# Patient Record
Sex: MALE | Race: AMERICAN INDIAN OR ALASKA NATIVE
[De-identification: names, ages, dates, MRNs, and addresses within clinical notes are randomized per-mention and may not be internally consistent; named-entity substitution may affect disease eponyms.]

---

## 2019-01-02 ENCOUNTER — HOSPITAL ENCOUNTER (EMERGENCY)
Dept: HOSPITAL 5 - ED | Age: 74
LOS: 1 days | Discharge: HOME | End: 2019-01-03
Payer: MEDICARE

## 2019-01-02 DIAGNOSIS — G43.909: ICD-10-CM

## 2019-01-02 DIAGNOSIS — F17.200: ICD-10-CM

## 2019-01-02 DIAGNOSIS — E86.0: ICD-10-CM

## 2019-01-02 DIAGNOSIS — N39.0: Primary | ICD-10-CM

## 2019-01-02 DIAGNOSIS — K21.9: ICD-10-CM

## 2019-01-02 DIAGNOSIS — M19.90: ICD-10-CM

## 2019-01-02 LAB
%HYPO/RBC NFR BLD AUTO: (no result) %
ALBUMIN SERPL-MCNC: 4 G/DL (ref 3.9–5)
ALT SERPL-CCNC: 23 UNITS/L (ref 7–56)
ANISOCYTOSIS BLD QL SMEAR: (no result)
APTT BLD: 32 SEC. (ref 24.2–36.6)
BAND NEUTROPHILS # (MANUAL): 0 K/MM3
BILIRUB UR QL STRIP: (no result)
BLOOD UR QL VISUAL: (no result)
BUN SERPL-MCNC: 17 MG/DL (ref 9–20)
BUN/CREAT SERPL: 19 %
CALCIUM SERPL-MCNC: 9.4 MG/DL (ref 8.4–10.2)
HCT VFR BLD CALC: 29.8 % (ref 35.5–45.6)
HEMOLYSIS INDEX: 3
HGB BLD-MCNC: 8.9 GM/DL (ref 11.8–15.2)
INR PPP: 1.03 (ref 0.87–1.13)
MCHC RBC AUTO-ENTMCNC: 30 % (ref 32–34)
MCV RBC AUTO: 69 FL (ref 84–94)
MYELOCYTES # (MANUAL): 0 K/MM3
PH UR STRIP: 5 [PH] (ref 5–7)
PLATELET # BLD: 191 K/MM3 (ref 140–440)
POIKILOCYTOSIS BLD QL SMEAR: (no result)
PROMYELOCYTES # (MANUAL): 0 K/MM3
PROT UR STRIP-MCNC: (no result) MG/DL
RBC # BLD AUTO: 4.34 M/MM3 (ref 3.65–5.03)
RBC #/AREA URNS HPF: 5 /HPF (ref 0–6)
TARGETS BLD QL SMEAR: (no result)
TOTAL CELLS COUNTED BLD: 100
UROBILINOGEN UR-MCNC: 2 MG/DL (ref ?–2)
WBC #/AREA URNS HPF: 23 /HPF (ref 0–6)

## 2019-01-02 PROCEDURE — 86901 BLOOD TYPING SEROLOGIC RH(D): CPT

## 2019-01-02 PROCEDURE — 85610 PROTHROMBIN TIME: CPT

## 2019-01-02 PROCEDURE — 71045 X-RAY EXAM CHEST 1 VIEW: CPT

## 2019-01-02 PROCEDURE — 86900 BLOOD TYPING SEROLOGIC ABO: CPT

## 2019-01-02 PROCEDURE — 96361 HYDRATE IV INFUSION ADD-ON: CPT

## 2019-01-02 PROCEDURE — 83690 ASSAY OF LIPASE: CPT

## 2019-01-02 PROCEDURE — 84484 ASSAY OF TROPONIN QUANT: CPT

## 2019-01-02 PROCEDURE — 81001 URINALYSIS AUTO W/SCOPE: CPT

## 2019-01-02 PROCEDURE — 82962 GLUCOSE BLOOD TEST: CPT

## 2019-01-02 PROCEDURE — 93005 ELECTROCARDIOGRAM TRACING: CPT

## 2019-01-02 PROCEDURE — 70450 CT HEAD/BRAIN W/O DYE: CPT

## 2019-01-02 PROCEDURE — 93010 ELECTROCARDIOGRAM REPORT: CPT

## 2019-01-02 PROCEDURE — 36415 COLL VENOUS BLD VENIPUNCTURE: CPT

## 2019-01-02 PROCEDURE — 99285 EMERGENCY DEPT VISIT HI MDM: CPT

## 2019-01-02 PROCEDURE — 85007 BL SMEAR W/DIFF WBC COUNT: CPT

## 2019-01-02 PROCEDURE — 85730 THROMBOPLASTIN TIME PARTIAL: CPT

## 2019-01-02 PROCEDURE — 96365 THER/PROPH/DIAG IV INF INIT: CPT

## 2019-01-02 PROCEDURE — 86850 RBC ANTIBODY SCREEN: CPT

## 2019-01-02 PROCEDURE — 85025 COMPLETE CBC W/AUTO DIFF WBC: CPT

## 2019-01-02 PROCEDURE — 80053 COMPREHEN METABOLIC PANEL: CPT

## 2019-01-02 NOTE — EMERGENCY DEPARTMENT REPORT
ED Dizziness HPI





- General


Chief Complaint: GI Bleed


Stated Complaint: GLUCOSE LOW


Time Seen by Provider: 01/02/19 21:08


Source: patient


Mode of arrival: Ambulatory


Limitations: No Limitations





- History of Present Illness


Initial Comments: 





73-year-old male presents to ED with report of dizziness and generalized 

weakness 3 weeks.  Patient states he has been having dark-colored stools for 

the last 3 weeks.  Patient concerned he may have a GI bleed, which she has had 

in the past.  Patient denies fever, abdominal pain, nausea, vomiting, diarrhea.


MD Complaint: dizziness


-: week(s) (3)


Timing: gradual onset


Description: lightheadedness


History of Trauma: No


Severity: mild


Improves With: nothing


Worsens With: nothing


Associated Symptoms: weakness (generalized).  denies: chest pain, cough, 

fever/chills, shortness of breath, syncope





- Related Data


                                Home Medications











 Medication  Instructions  Recorded  Confirmed  Last Taken


 


Pantoprazole [Protonix] 40 mg PO QDAY 09/20/16 09/22/16 09/20/16


 


Ranitidine (Nf) [Zantac (Nf)] 150 mg PO BID 09/20/16 09/22/16 09/20/16


 


buPROPion SR [Wellbutrin Sr] 150 mg PO BID 09/20/16 09/22/16 09/20/16








                                  Previous Rx's











 Medication  Instructions  Recorded  Last Taken  Type


 


HYDROcodone/APAP 5-325 [[place] 1 each PO Q4HR PRN #20 tablet 09/22/16 Unknown Rx





5/325]    


 


Nitrofurantoin Mono/M-Cryst 100 mg PO Q12HR #14 capsule 01/02/19 Unknown Rx





[Macrobid CAP]    











                                    Allergies











Allergy/AdvReac Type Severity Reaction Status Date / Time


 


No Known Allergies Allergy   Verified 10/14/15 15:43














ED Review of Systems


ROS: 


Stated complaint: GLUCOSE LOW


Other details as noted in HPI





Comment: All other systems reviewed and negative


Constitutional: denies: chills, fever


Respiratory: denies: cough, shortness of breath


Cardiovascular: denies: chest pain


Gastrointestinal: melena.  denies: abdominal pain, nausea, vomiting, diarrhea


Genitourinary: frequency


Neurological: denies: headache, numbness, paresthesias





ED Past Medical Hx





- Past Medical History


Hx Hypertension: No


Hx Heart Attack/AMI: No


Hx GERD: Yes


Hx Arthritis: Yes


Hx Headaches / Migraines: Yes (MIGRAINES)


Additional medical history: Peptic ulcer disease status post surgery.  ANEMIA





- Surgical History


Additional Surgical History: Peptic ulcer disease surgery, hemorrhoidectomy





- Social History


Smoking Status: Current Every Day Smoker


Substance Use Type: Alcohol





- Medications


Home Medications: 


                                Home Medications











 Medication  Instructions  Recorded  Confirmed  Last Taken  Type


 


Pantoprazole [Protonix] 40 mg PO QDAY 09/20/16 09/22/16 09/20/16 History


 


Ranitidine (Nf) [Zantac (Nf)] 150 mg PO BID 09/20/16 09/22/16 09/20/16 History


 


buPROPion SR [Wellbutrin Sr] 150 mg PO BID 09/20/16 09/22/16 09/20/16 History


 


HYDROcodone/APAP 5-325 [[place] 1 each PO Q4HR PRN #20 tablet 09/22/16  Unknown Rx





5/325]     


 


Nitrofurantoin Mono/M-Cryst 100 mg PO Q12HR #14 capsule 01/02/19  Unknown Rx





[Macrobid CAP]     














ED Physical Exam





- General


Limitations: No Limitations


General appearance: alert, in no apparent distress





- Head


Head exam: Present: atraumatic, normocephalic





- Eye


Eye exam: Present: normal appearance





- ENT


ENT exam: Present: mucous membranes moist





- Neck


Neck exam: Present: normal inspection





- Respiratory


Respiratory exam: Present: normal lung sounds bilaterally.  Absent: respiratory 

distress





- Cardiovascular


Cardiovascular Exam: Present: regular rate, normal rhythm





- GI/Abdominal


GI/Abdominal exam: Present: soft.  Absent: distended, tenderness





- Rectal


Rectal exam: Present: heme (-) stool (stool is light brown)





- Extremities Exam


Extremities exam: Present: normal inspection





- Neurological Exam


Neurological exam: Present: alert, oriented X3, CN II-XII intact, normal gait.  

Absent: motor sensory deficit





- Psychiatric


Psychiatric exam: Present: normal affect, normal mood





- Skin


Skin exam: Present: warm, dry, intact, normal color





ED Course


                                   Vital Signs











  01/02/19 01/02/19 01/02/19





  19:09 22:05 22:45


 


Temperature 97.9 F  


 


Pulse Rate 103 H 88 87


 


Pulse Rate [  88 





Lying]   


 


Respiratory 18 16 24





Rate   


 


Blood Pressure 120/69  


 


Blood Pressure  134/58 





[Lying]   


 


Blood Pressure  134/58 





[Right]   


 


O2 Sat by Pulse 96 100 97





Oximetry   














  01/02/19 01/02/19 01/02/19





  23:00 23:15 23:31


 


Temperature   


 


Pulse Rate 83 82 80


 


Pulse Rate [   





Lying]   


 


Respiratory 26 H 20 18





Rate   


 


Blood Pressure 134/47 134/47 134/47


 


Blood Pressure   





[Lying]   


 


Blood Pressure   





[Right]   


 


O2 Sat by Pulse 98  87





Oximetry   














  01/02/19 01/03/19





  23:45 00:00


 


Temperature  


 


Pulse Rate 86 


 


Pulse Rate [  





Lying]  


 


Respiratory 20 





Rate  


 


Blood Pressure 134/47 150/71


 


Blood Pressure  





[Lying]  


 


Blood Pressure  





[Right]  


 


O2 Sat by Pulse 98 98





Oximetry  














ED Medical Decision Making





- Lab Data


Result diagrams: 


                                 01/02/19 19:32





                                 01/02/19 19:32





- EKG Data


-: EKG Interpreted by Me


EKG shows normal: sinus rhythm, axis, intervals, QRS complexes, ST-T waves


Rate: normal





- EKG Data


Interpretation: no acute changes, other (occasional PACs)





- Radiology Data


Radiology results: report reviewed, image reviewed





- Medical Decision Making





73-year-old male presents with 3 week history of generalized weakness, 

dizziness.  Patient initially but he was having GI bleed, since he has had that 

in the past.  Reportedly is having dark black stools, however on exam, stool is 

light brown in color and heme negative.  Vital signs normal, hemoglobin 8.9.  

Chest x-ray and head CT both negative for any acute findings.  She has normal 

neuro exam.  Orthostatics vitals show that the patient is mildly dehydrated.  1 

L bolus IV fluids given.  UA also shows UTI.  The combination of these 2 pains 

is likely what is causing patient's generalized weakness and dizziness.  Patient

also given IV Rocephin.  He is feeling much better at this time.  Will discharge

home, return precautions given.  Will give prescription for Macrobid for UTI.  

Outpatient follow-up advised.





- Differential Diagnosis


anemia, GI bleed, UTI, hypokalemia, CVA, pneumonia


Critical care attestation.: 


If time is entered above; I have spent that time in minutes in the direct care 

of this critically ill patient, excluding procedure time.








ED Disposition


Clinical Impression: 


 UTI (urinary tract infection), Dehydration





Disposition: DC-01 TO HOME OR SELFCARE


Is pt being admited?: No


Condition: Stable


Instructions:  Dehydration (ED), Urinary Tract Infection in Men (ED), Weakness 

(ED)


Prescriptions: 


Nitrofurantoin Mono/M-Cryst [Macrobid CAP] 100 mg PO Q12HR #14 capsule


Referrals: 


PRIMARY CARE,MD [Primary Care Provider] - 3-5 Days


Forms:  Accompanied Note


Time of Disposition: 23:48

## 2019-01-02 NOTE — CAT SCAN REPORT
FINAL REPORT



PROCEDURE:  CT HEAD/BRAIN WO CON



TECHNIQUE:  Computerized tomography of the head was performed without contrast material. 



HISTORY:  dizziness 



COMPARISON:  No prior studies are available for comparison.



FINDINGS:  

Skull and scalp: Normal.



Paranasal sinuses: Normal.



Ventricles and subarachnoid spaces: Are prominent consistent with cerebral atrophy appropriate for pa
tient's age..



Cerebrum: There is mild-to-moderate degree nonspecific bilateral cerebral white matter hypodensity mo
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 An old lacunar infarct is noted in right thalamus..



Cerebellum and brainstem: No evidence of hemorrhage, acute infarction or mass.



Vasculature: Normal.



Comments: None.



IMPRESSION:  

No acute intracranial abnormality



Old lacunar infarct right thalamus

## 2019-01-02 NOTE — XRAY REPORT
FINAL REPORT



PROCEDURE:  XR CHEST 1V AP



TECHNIQUE:  Chest radiograph anteroposterior view. CPT 63523







HISTORY:  weakness 



COMPARISON:  No prior studies are available for comparison.



FINDINGS:  

Heart: Normal.



Mediastinum/Vessels: Normal.



Lungs/Pleural space: Lungs are hyperinflated. There are no confluent infiltrates or mass lesions. Ple
ural spaces are clear..



Bony thorax: No acute osseous abnormality.



Life support devices: None.



IMPRESSION:  

COPD



No acute pulmonary process.

## 2019-01-03 VITALS — SYSTOLIC BLOOD PRESSURE: 150 MMHG | DIASTOLIC BLOOD PRESSURE: 71 MMHG

## 2019-01-19 ENCOUNTER — HOSPITAL ENCOUNTER (EMERGENCY)
Dept: HOSPITAL 5 - ED | Age: 74
Discharge: HOME | End: 2019-01-19
Payer: MEDICARE

## 2019-01-19 VITALS — SYSTOLIC BLOOD PRESSURE: 138 MMHG | DIASTOLIC BLOOD PRESSURE: 69 MMHG

## 2019-01-19 DIAGNOSIS — N30.00: ICD-10-CM

## 2019-01-19 DIAGNOSIS — M19.90: ICD-10-CM

## 2019-01-19 DIAGNOSIS — T78.40XA: Primary | ICD-10-CM

## 2019-01-19 DIAGNOSIS — K21.9: ICD-10-CM

## 2019-01-19 DIAGNOSIS — G43.909: ICD-10-CM

## 2019-01-19 DIAGNOSIS — F17.200: ICD-10-CM

## 2019-01-19 LAB
%HYPO/RBC NFR BLD AUTO: (no result) %
BAND NEUTROPHILS # (MANUAL): 0 K/MM3
BILIRUB UR QL STRIP: (no result)
BLOOD UR QL VISUAL: (no result)
BUN SERPL-MCNC: 16 MG/DL (ref 9–20)
BUN/CREAT SERPL: 20 %
CALCIUM SERPL-MCNC: 9.3 MG/DL (ref 8.4–10.2)
HCT VFR BLD CALC: 29.1 % (ref 35.5–45.6)
HEMOLYSIS INDEX: 0
HGB BLD-MCNC: 8.6 GM/DL (ref 11.8–15.2)
MCHC RBC AUTO-ENTMCNC: 30 % (ref 32–34)
MCV RBC AUTO: 69 FL (ref 84–94)
MYELOCYTES # (MANUAL): 0 K/MM3
PH UR STRIP: 5 [PH] (ref 5–7)
PLATELET # BLD: 214 K/MM3 (ref 140–440)
PROMYELOCYTES # (MANUAL): 0 K/MM3
PROT UR STRIP-MCNC: (no result) MG/DL
RBC # BLD AUTO: 4.22 M/MM3 (ref 3.65–5.03)
RBC #/AREA URNS HPF: 1 /HPF (ref 0–6)
TARGETS BLD QL SMEAR: (no result)
TOTAL CELLS COUNTED BLD: 100
UROBILINOGEN UR-MCNC: < 2 MG/DL (ref ?–2)
WBC #/AREA URNS HPF: 2 /HPF (ref 0–6)

## 2019-01-19 PROCEDURE — 99283 EMERGENCY DEPT VISIT LOW MDM: CPT

## 2019-01-19 PROCEDURE — 81001 URINALYSIS AUTO W/SCOPE: CPT

## 2019-01-19 PROCEDURE — 85025 COMPLETE CBC W/AUTO DIFF WBC: CPT

## 2019-01-19 PROCEDURE — 85007 BL SMEAR W/DIFF WBC COUNT: CPT

## 2019-01-19 PROCEDURE — 80048 BASIC METABOLIC PNL TOTAL CA: CPT

## 2019-01-19 PROCEDURE — 36415 COLL VENOUS BLD VENIPUNCTURE: CPT

## 2019-01-19 NOTE — EMERGENCY DEPARTMENT REPORT
ED General Adult HPI





- General


Chief complaint: Weakness


Stated complaint: WEAKNESS


Time Seen by Provider: 01/19/19 12:43


Source: patient


Mode of arrival: Ambulatory


Limitations: No Limitations





- History of Present Illness


Initial comments: 





Patient is a 73-year-old -American male who was started on Macrobid 

several days ago for a urinary tract infection.  Patient states since then he's 

felt some shortness of breath and itching to the skin.  He states that he also 

does have some mild weakness as well.  Patient denies any nausea vomiting diarrh

ea.  Patient is continuing to have some suprapubic discomfort as well.  Patient 

denies any actual dysuria.





- Related Data


                                Home Medications











 Medication  Instructions  Recorded  Confirmed  Last Taken


 


Pantoprazole [Protonix] 40 mg PO QDAY 09/20/16 09/22/16 09/20/16


 


Ranitidine (Nf) [Zantac (Nf)] 150 mg PO BID 09/20/16 09/22/16 09/20/16


 


buPROPion SR [Wellbutrin Sr] 150 mg PO BID 09/20/16 09/22/16 09/20/16








                                  Previous Rx's











 Medication  Instructions  Recorded  Last Taken  Type


 


HYDROcodone/APAP 5-325 [[place] 1 each PO Q4HR PRN #20 tablet 09/22/16 Unknown Rx





5/325]    


 


Ciprofloxacin HCl [Cipro] 500 mg PO BID #10 tablet 01/19/19 Unknown Rx


 


diphenhydrAMINE [Benadryl CAP] 25 mg PO Q6HR PRN #12 capsule 01/19/19 Unknown Rx











                                    Allergies











Allergy/AdvReac Type Severity Reaction Status Date / Time


 


No Known Allergies Allergy   Verified 01/19/19 12:35














ED Review of Systems


ROS: 


Stated complaint: WEAKNESS


Other details as noted in HPI





Comment: All other systems reviewed and negative





ED Past Medical Hx





- Past Medical History


Hx Hypertension: No


Hx Heart Attack/AMI: No


Hx GERD: Yes


Hx Arthritis: Yes


Hx Headaches / Migraines: Yes (MIGRAINES)


Additional medical history: Peptic ulcer disease status post surgery.  ANEMIA





- Surgical History


Additional Surgical History: Peptic ulcer disease surgery, hemorrhoidectomy





- Social History


Smoking Status: Current Every Day Smoker


Substance Use Type: Alcohol





- Medications


Home Medications: 


                                Home Medications











 Medication  Instructions  Recorded  Confirmed  Last Taken  Type


 


Pantoprazole [Protonix] 40 mg PO QDAY 09/20/16 09/22/16 09/20/16 History


 


Ranitidine (Nf) [Zantac (Nf)] 150 mg PO BID 09/20/16 09/22/16 09/20/16 History


 


buPROPion SR [Wellbutrin Sr] 150 mg PO BID 09/20/16 09/22/16 09/20/16 History


 


HYDROcodone/APAP 5-325 [[place] 1 each PO Q4HR PRN #20 tablet 09/22/16  Unknown Rx





5/325]     


 


Ciprofloxacin HCl [Cipro] 500 mg PO BID #10 tablet 01/19/19  Unknown Rx


 


diphenhydrAMINE [Benadryl CAP] 25 mg PO Q6HR PRN #12 capsule 01/19/19  Unknown 

Rx














ED Physical Exam





- General


Limitations: No Limitations


General appearance: alert, in no apparent distress





- Head


Head exam: Present: atraumatic, normocephalic





- Eye


Eye exam: Present: normal appearance





- ENT


ENT exam: Present: mucous membranes moist





- Neck


Neck exam: Present: normal inspection





- Respiratory


Respiratory exam: Present: normal lung sounds bilaterally.  Absent: respiratory 

distress, wheezes, rales, rhonchi





- Cardiovascular


Cardiovascular Exam: Present: regular rate, normal rhythm.  Absent: systolic 

murmur, diastolic murmur, rubs, gallop





- GI/Abdominal


GI/Abdominal exam: Present: soft, normal bowel sounds.  Absent: distended, 

tenderness, guarding, rebound





- Rectal


Rectal exam: Present: deferred





- Extremities Exam


Extremities exam: Present: normal inspection





- Back Exam


Back exam: Present: normal inspection





- Neurological Exam


Neurological exam: Present: alert, oriented X3





- Psychiatric


Psychiatric exam: Present: normal affect, normal mood





- Skin


Skin exam: Present: warm, dry, intact, normal color.  Absent: rash





ED Course





                                   Vital Signs











  01/19/19





  12:35


 


Temperature 98.0 F


 


Pulse Rate 110 H


 


Respiratory 20





Rate 


 


Blood Pressure 128/66


 


O2 Sat by Pulse 100





Oximetry 














ED Medical Decision Making





- Lab Data


Result diagrams: 


                                 01/19/19 13:24





                                 01/19/19 13:24








                                   Lab Results











  01/19/19 01/19/19 01/19/19 Range/Units





  12:58 13:24 13:24 


 


WBC   6.1   (4.5-11.0)  K/mm3


 


RBC   4.22   (3.65-5.03)  M/mm3


 


Hgb   8.6 L   (11.8-15.2)  gm/dl


 


Hct   29.1 L   (35.5-45.6)  %


 


MCV   69 L   (84-94)  fl


 


MCH   21 L   (28-32)  pg


 


MCHC   30 L   (32-34)  %


 


RDW   20.7 H   (13.2-15.2)  %


 


Plt Count   214   (140-440)  K/mm3


 


Add Manual Diff   Complete   


 


Total Counted   100   


 


Seg Neuts % (Manual)   74.0 H   (40.0-70.0)  %


 


Band Neutrophils %   0   %


 


Lymphocytes % (Manual)   15.0   (13.4-35.0)  %


 


Reactive Lymphs % (Man)   0   %


 


Monocytes % (Manual)   9.0 H   (0.0-7.3)  %


 


Eosinophils % (Manual)   2.0   (0.0-4.3)  %


 


Basophils % (Manual)   0   (0.0-1.8)  %


 


Metamyelocytes %   0   %


 


Myelocytes %   0   %


 


Promyelocytes %   0   %


 


Blast Cells %   0   %


 


Nucleated RBC %   Not Reportable   


 


Seg Neutrophils # Man   4.5   (1.8-7.7)  K/mm3


 


Band Neutrophils #   0.0   K/mm3


 


Lymphocytes # (Manual)   0.9 L   (1.2-5.4)  K/mm3


 


Abs React Lymphs (Man)   0.0   K/mm3


 


Monocytes # (Manual)   0.5   (0.0-0.8)  K/mm3


 


Eosinophils # (Manual)   0.1   (0.0-0.4)  K/mm3


 


Basophils # (Manual)   0.0   (0.0-0.1)  K/mm3


 


Metamyelocytes #   0.0   K/mm3


 


Myelocytes #   0.0   K/mm3


 


Promyelocytes #   0.0   K/mm3


 


Blast Cells #   0.0   K/mm3


 


WBC Morphology   Not Reportable   


 


Hypersegmented Neuts   Not Reportable   


 


Hyposegmented Neuts   Not Reportable   


 


Hypogranular Neuts   Not Reportable   


 


Smudge Cells   Not Reportable   


 


Toxic Granulation   Not Reportable   


 


Toxic Vacuolation   Not Reportable   


 


Dohle Bodies   Not Reportable   


 


Pelger-Huet Anomaly   Not Reportable   


 


K[place]ney Rods   Not Reportable   


 


Platelet Estimate   Consistent w auto   


 


Clumped Platelets   Not Reportable   


 


Plt Clumps, EDTA   Not Reportable   


 


Large Platelets   Not Reportable   


 


Giant Platelets   Not Reportable   


 


Platelet Satelliting   Not Reportable   


 


Plt Morphology Comment   Not Reportable   


 


RBC Morphology   Not Reportable   


 


Dimorphic RBCs   Not Reportable   


 


Polychromasia   Not Reportable   


 


Hypochromasia   2+   


 


Poikilocytosis   Not Reportable   


 


Anisocytosis   Not Reportable   


 


Microcytosis   Not Reportable   


 


Macrocytosis   Not Reportable   


 


Spherocytes   Not Reportable   


 


Pappenheimer Bodies   Not Reportable   


 


Sickle Cells   Not Reportable   


 


Target Cells   2+   


 


Tear Drop Cells   Few   


 


Ovalocytes   Not Reportable   


 


Helmet Cells   Not Reportable   


 


Squires-Torboy Bodies   Not Reportable   


 


[place] Rings   Not Reportable   


 


[place] Cells   Not Reportable   


 


Bite Cells   Not Reportable   


 


Crenated Cell   Not Reportable   


 


Elliptocytes   Few   


 


Acanthocytes (Spur)   Not Reportable   


 


Rouleaux   Not Reportable   


 


Hemoglobin C Crystals   Not Reportable   


 


Schistocytes   Not Reportable   


 


Malaria parasites   Not Reportable   


 


Raul Bodies   Not Reportable   


 


Hem Pathologist Commnt   No   


 


Sodium    142  (137-145)  mmol/L


 


Potassium    4.1  (3.6-5.0)  mmol/L


 


Chloride    106.1  ()  mmol/L


 


Carbon Dioxide    25  (22-30)  mmol/L


 


Anion Gap    15  mmol/L


 


BUN    16  (9-20)  mg/dL


 


Creatinine    0.8  (0.8-1.5)  mg/dL


 


Estimated GFR    > 60  ml/min


 


BUN/Creatinine Ratio    20  %


 


Glucose    89  ()  mg/dL


 


Calcium    9.3  (8.4-10.2)  mg/dL


 


Urine Color  Yellow    (Yellow)  


 


Urine Turbidity  Clear    (Clear)  


 


Urine pH  5.0    (5.0-7.0)  


 


Ur Specific Gravity  1.018    (1.003-1.030)  


 


Urine Protein  <15 mg/dl    (Negative)  mg/dL


 


Urine Glucose (UA)  Neg    (Negative)  mg/dL


 


Urine Ketones  Neg    (Negative)  mg/dL


 


Urine Blood  Neg    (Negative)  


 


Urine Nitrite  Neg    (Negative)  


 


Urine Bilirubin  Neg    (Negative)  


 


Urine Urobilinogen  < 2.0    (<2.0)  mg/dL


 


Ur Leukocyte Esterase  Tr    (Negative)  


 


Urine WBC (Auto)  2.0    (0.0-6.0)  /HPF


 


Urine RBC (Auto)  1.0    (0.0-6.0)  /HPF


 


U Epithel Cells (Auto)  < 1.0    (0-13.0)  /HPF














- Medical Decision Making





Patient likely has a mild allergy to Macrobid will be switched to Cipro.  

Patient also given meds for his allergic reaction and the patient will be 

discharged home.


Critical care attestation.: 


If time is entered above; I have spent that time in minutes in the direct care 

of this critically ill patient, excluding procedure time.








ED Disposition


Clinical Impression: 


Allergic reaction


Qualifiers:


 Encounter type: initial encounter Qualified Code(s): T78.40XA - Allergy, 

unspecified, initial encounter





UTI (urinary tract infection)


Qualifiers:


 Urinary tract infection type: acute cystitis Hematuria presence: without 

hematuria Qualified Code(s): N30.00 - Acute cystitis without hematuria





Disposition: DC-01 TO HOME OR SELFCARE


Is pt being admited?: No


Does the pt Need Aspirin: No


Condition: Stable


Instructions:  Allergies (ED)


Referrals: 


SYLVAIN MAIN MD [Primary Care Provider] - 3-5 Days


Time of Disposition: 15:16

## 2019-07-23 ENCOUNTER — HOSPITAL ENCOUNTER (EMERGENCY)
Dept: HOSPITAL 5 - ED | Age: 74
Discharge: HOME | End: 2019-07-23
Payer: MEDICARE

## 2019-07-23 VITALS — SYSTOLIC BLOOD PRESSURE: 153 MMHG | DIASTOLIC BLOOD PRESSURE: 88 MMHG

## 2019-07-23 DIAGNOSIS — K22.2: ICD-10-CM

## 2019-07-23 DIAGNOSIS — K21.9: ICD-10-CM

## 2019-07-23 DIAGNOSIS — D50.9: ICD-10-CM
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
## 2019-07-23 DIAGNOSIS — T18.128A: Primary | ICD-10-CM

## 2019-07-23 DIAGNOSIS — Z79.899: ICD-10-CM

## 2019-07-23 DIAGNOSIS — F17.200: ICD-10-CM

## 2019-07-23 DIAGNOSIS — G43.909: ICD-10-CM

## 2019-07-23 DIAGNOSIS — Z98.890: ICD-10-CM

## 2019-07-23 DIAGNOSIS — M19.90: ICD-10-CM

## 2019-07-23 LAB
APTT BLD: 30 SEC. (ref 24.2–36.6)
BASOPHILS # (AUTO): 0.1 K/MM3 (ref 0–0.1)
BASOPHILS NFR BLD AUTO: 1.5 % (ref 0–1.8)
BUN SERPL-MCNC: 15 MG/DL (ref 9–20)
BUN/CREAT SERPL: 17 %
CALCIUM SERPL-MCNC: 9.1 MG/DL (ref 8.4–10.2)
EOSINOPHIL # BLD AUTO: 0 K/MM3 (ref 0–0.4)
EOSINOPHIL NFR BLD AUTO: 0.5 % (ref 0–4.3)
HCT VFR BLD CALC: 27.8 % (ref 35.5–45.6)
HEMOLYSIS INDEX: 6
HGB BLD-MCNC: 8.2 GM/DL (ref 11.8–15.2)
INR PPP: 1.1 (ref 0.87–1.13)
LYMPHOCYTES # BLD AUTO: 0.4 K/MM3 (ref 1.2–5.4)
LYMPHOCYTES NFR BLD AUTO: 8.4 % (ref 13.4–35)
MCHC RBC AUTO-ENTMCNC: 30 % (ref 32–34)
MCV RBC AUTO: 63 FL (ref 84–94)
MONOCYTES # (AUTO): 0.8 K/MM3 (ref 0–0.8)
MONOCYTES % (AUTO): 15.2 % (ref 0–7.3)
PLATELET # BLD: 201 K/MM3 (ref 140–440)
RBC # BLD AUTO: 4.41 M/MM3 (ref 3.65–5.03)

## 2019-07-23 PROCEDURE — 85025 COMPLETE CBC W/AUTO DIFF WBC: CPT

## 2019-07-23 PROCEDURE — 70360 X-RAY EXAM OF NECK: CPT

## 2019-07-23 PROCEDURE — 36415 COLL VENOUS BLD VENIPUNCTURE: CPT

## 2019-07-23 PROCEDURE — 43247 EGD REMOVE FOREIGN BODY: CPT

## 2019-07-23 PROCEDURE — 85730 THROMBOPLASTIN TIME PARTIAL: CPT

## 2019-07-23 PROCEDURE — 93010 ELECTROCARDIOGRAM REPORT: CPT

## 2019-07-23 PROCEDURE — 99284 EMERGENCY DEPT VISIT MOD MDM: CPT

## 2019-07-23 PROCEDURE — 93005 ELECTROCARDIOGRAM TRACING: CPT

## 2019-07-23 PROCEDURE — 71046 X-RAY EXAM CHEST 2 VIEWS: CPT

## 2019-07-23 PROCEDURE — 80048 BASIC METABOLIC PNL TOTAL CA: CPT

## 2019-07-23 PROCEDURE — 85610 PROTHROMBIN TIME: CPT

## 2019-07-23 NOTE — GASTROENTEROLOGY CONSULTATION
History of Present Illness





- Reason for Consult


Consult date: 07/23/19


Food impaction


Requesting physician: NAHUM CHEUNG





- History of Present Illness





this is a pleasant 74-year-old male PMH GERD, prior food impaction from 

esophageal stenosis, presents with food impaction.  Symptoms started yesterday 

afternoon before 3 PM.  Patient was eating steak and feels like a piece stuck in

his "throat".  Patient has been able able to swallow water or his saliva since 

and has been spitting in a bag.  He reports intermittant dysphagia to solids 

that is gradually worsening for the last 4 years. Worse with eating fast, better

with small bites and thin food.  Also with associated weight loss.  He denies 

blood thinner use and does not currently take any medications. 


Had EGD 2014 that did not show any malignancy








- Past Medical History


Previous Medical History?: Yes


Hx Hypertension: No


Hx Heart Attack/AMI: No


Hx GERD: Yes


Hx Arthritis: Yes


Hx Headaches / Migraines: Yes (MIGRAINES)


Additional medical history: Peptic ulcer disease status post surgery.  ANEMIA





- Surgical History


Past Surgical History?: Yes


Additional Surgical History: Peptic ulcer disease surgery, hemorrhoidectomy





- Social History


Smoking Status: Current Every Day Smoker


Substance Use Type: Alcohol





FH - CAD








Home meds obtained/updated/reviewed and reconciled





Medications and Allergies


                                    Allergies











Allergy/AdvReac Type Severity Reaction Status Date / Time


 


No Known Allergies Allergy   Verified 01/19/19 12:35











                                Home Medications











 Medication  Instructions  Recorded  Confirmed  Last Taken  Type


 


Pantoprazole [Protonix] 40 mg PO QDAY 09/20/16 09/22/16 09/20/16 History


 


Ranitidine (Nf) [Zantac (Nf)] 150 mg PO BID 09/20/16 09/22/16 09/20/16 History


 


buPROPion SR [Wellbutrin Sr] 150 mg PO BID 09/20/16 09/22/16 09/20/16 History


 


HYDROcodone/APAP 5-325 [[place] 1 each PO Q4HR PRN #20 tablet 09/22/16  Unknown Rx





5/325]     


 


Ciprofloxacin HCl [Cipro] 500 mg PO BID #10 tablet 01/19/19  Unknown Rx


 


diphenhydrAMINE [Benadryl CAP] 25 mg PO Q6HR PRN #12 capsule 01/19/19  Unknown 

Rx


 


Ferrous Sulfate [Ferrous Sulfate 220 mg PO DAILY 30 Days  solution 07/23/19  

Unknown Rx





220 MG/5 ML]     


 


Pantoprazole [Protonix TAB] 20 mg PO QDAY #30 tablet. 07/23/19  Unknown Rx














Review of Systems





- Review of Systems


All systems: negative


Constitutional: weight loss


Gastrointestinal: other (dysphagia)





Exam





- Constitutional


Vital Signs: 


                                        











Temp Pulse Resp BP Pulse Ox


 


 97.1 F L  59 L  18   151/91   98 


 


 07/23/19 08:20  07/23/19 08:20  07/23/19 08:20  07/23/19 08:20  07/23/19 08:20











General appearance: cachectic, temporal muscle wasting





- EENT


Eyes: EOM intact





- Neck


Neck: supple





- Respiratory


Respiratory effort: normal


Respiratory: bilateral: CTA





- Cardiovascular


Rhythm: regular





- Gastrointestinal


General gastrointestinal: Present: soft





- Integumentary


Integumentary: Present: warm





- Neurologic


Neurological: alert and oriented x3





- Psychiatric


Psychiatric: appropriate mood/affect





- Labs


CBC & Chem 7: 


                                 07/23/19 09:15





                                 07/23/19 09:15





Assessment and Plan





Physical exam and history consistent with food impaction of esophagus without 

perforation, therefore will pursue emergent EGD for this life threatening 

condition


Differential diagnosis for the cause of the impaction includes ring, web, 

malignancy, extrinsic compression, etc.


Maintain NPO and we will perform the EGD shortly, final recs based upon EGD 

results





- Patient Problems


(1) Food impaction of esophagus


Status: Acute

## 2019-07-23 NOTE — ANESTHESIA CONSULTATION
Anesthesia Consult and Med Hx


Date of service: 07/23/19





- Airway


Anesthetic Teeth Evaluation: Partials


ROM Head & Neck: Adequate


Mental/Hyoid Distance: Adequate


Mallampati Class: Class II


Intubation Access Assessment: Good





- Pulmonary Exam


CTA: Yes





- Cardiac Exam


Cardiac Exam: RRR





- Pre-Operative Health Status


ASA Pre-Surgery Classification: ASA2


Proposed Anesthetic Plan: MAC





- Pulmonary


Hx Smoking: Yes (1/2 PPD X 54 YRS)


SOB: Yes





- Cardiovascular System


Hx Hypertension: No


Hx Heart Attack/AMI: No


Hx Angina: No





- Central Nervous System


CVA: No





- Gastrointestinal


Hx Ulcer: Yes


Hx Gastroesophageal Reflux Disease: Yes





- Hematic


Hx Anemia: Yes

## 2019-07-23 NOTE — EMERGENCY DEPARTMENT REPORT
ED General Adult HPI





- General


Chief complaint: Dyspnea/Respdistress


Stated complaint: SOB/SOMETHING IN THROAT


Time Seen by Provider: 07/23/19 08:44


Source: patient, old records reviewed (2014 egd with showed proximal esophageal 

stricture)


Mode of arrival: Ambulatory


Limitations: No Limitations





- History of Present Illness


Initial comments: 





74-year-old male with a past medical history of arthritis, GERD, peptic ulcer 

disease requiring surgery, and previous esophageal dilatation procedures 

presents to the Hospital complains of foreign body sensation in esophagus.  

Symptoms started yesterday afternoon before 3 PM.  Patient was eating steak and 

feels like a piece stuck in his "throat".  Patient has been able able to swallow

water or his saliva since and has been spitting in a bag.  He's had similar 

episodes of esophageal issues requiring dilatation with last procedure 2-3 years

ago.  He denies blood thinner use and does not currently take any medications.  

He does not currently have a GI doctor.





- Related Data


                                Home Medications











 Medication  Instructions  Recorded  Confirmed  Last Taken


 


Pantoprazole [Protonix] 40 mg PO QDAY 09/20/16 09/22/16 09/20/16


 


Ranitidine (Nf) [Zantac (Nf)] 150 mg PO BID 09/20/16 09/22/16 09/20/16


 


buPROPion SR [Wellbutrin Sr] 150 mg PO BID 09/20/16 09/22/16 09/20/16








                                  Previous Rx's











 Medication  Instructions  Recorded  Last Taken  Type


 


HYDROcodone/APAP 5-325 [[place] 1 each PO Q4HR PRN #20 tablet 09/22/16 Unknown Rx





5/325]    


 


Ciprofloxacin HCl [Cipro] 500 mg PO BID #10 tablet 01/19/19 Unknown Rx


 


diphenhydrAMINE [Benadryl CAP] 25 mg PO Q6HR PRN #12 capsule 01/19/19 Unknown Rx


 


Ferrous Sulfate [Ferrous Sulfate 220 mg PO DAILY 30 Days  solution 07/23/19 

Unknown Rx





220 MG/5 ML]    


 


Pantoprazole [Protonix TAB] 20 mg PO QDAY #30 tablet. 07/23/19 Unknown Rx











                                    Allergies











Allergy/AdvReac Type Severity Reaction Status Date / Time


 


No Known Allergies Allergy   Verified 01/19/19 12:35














ED Review of Systems


ROS: 


Stated complaint: SOB/SOMETHING IN THROAT


Other details as noted in HPI





Comment: All other systems reviewed and negative





ED Past Medical Hx





- Past Medical History


Previous Medical History?: Yes


Hx Hypertension: No


Hx Heart Attack/AMI: No


Hx GERD: Yes


Hx Arthritis: Yes


Hx Headaches / Migraines: Yes (MIGRAINES)


Additional medical history: Peptic ulcer disease status post surgery.  ANEMIA





- Surgical History


Past Surgical History?: Yes


Additional Surgical History: Peptic ulcer disease surgery, hemorrhoidectomy





- Social History


Smoking Status: Current Every Day Smoker


Substance Use Type: Alcohol





- Medications


Home Medications: 


                                Home Medications











 Medication  Instructions  Recorded  Confirmed  Last Taken  Type


 


Pantoprazole [Protonix] 40 mg PO QDAY 09/20/16 09/22/16 09/20/16 History


 


Ranitidine (Nf) [Zantac (Nf)] 150 mg PO BID 09/20/16 09/22/16 09/20/16 History


 


buPROPion SR [Wellbutrin Sr] 150 mg PO BID 09/20/16 09/22/16 09/20/16 History


 


HYDROcodone/APAP 5-325 [[place] 1 each PO Q4HR PRN #20 tablet 09/22/16  Unknown Rx





5/325]     


 


Ciprofloxacin HCl [Cipro] 500 mg PO BID #10 tablet 01/19/19  Unknown Rx


 


diphenhydrAMINE [Benadryl CAP] 25 mg PO Q6HR PRN #12 capsule 01/19/19  Unknown 

Rx


 


Ferrous Sulfate [Ferrous Sulfate 220 mg PO DAILY 30 Days  solution 07/23/19  

Unknown Rx





220 MG/5 ML]     


 


Pantoprazole [Protonix TAB] 20 mg PO QDAY #30 tablet. 07/23/19  Unknown Rx














ED Physical Exam





- General


Limitations: No Limitations





- Other


Other exam information: 





General: No limitations, patient is alert in no acute distress


Head exam: Atraumatic, normocephalic


Eyes exam: Normal appearance


ENT: Moist mucous membrane


Neck exam: Normal inspection, full range of motion, no meningismus nontender, no

stridor


Respiratory exam: Clear to auscultation bilateral, no wheezes, rales, crackles


Cardiovascular: Normal rate and rhythm, normal heart sounds


Abdomen: Soft, nondistended, and  nontender, with normal bowel sounds, no 

rebound, or guarding


Extremity: Full range of motion normal inspection no deformity


Back: Normal Inspection, full range of motion, no tenderness


Neurologic: Alert, oriented x3, cranial nerves intact, no motor or sensory 

deficit


Psychiatric: normal affect, normal mood


Skin: Warm, dry, intact





ED Course


                                   Vital Signs











  07/23/19 07/23/19 07/23/19





  08:20 09:28 09:29


 


Temperature 97.1 F L 97.8 F 


 


Pulse Rate 59 L 71 


 


Respiratory 18 16 16





Rate   


 


Blood Pressure 151/91  


 


Blood Pressure  133/79 





[Left]   


 


O2 Sat by Pulse 98 100 





Oximetry   














  07/23/19 07/23/19 07/23/19





  11:25 12:48 12:50


 


Temperature 97.5 F L  


 


Pulse Rate 82 69 


 


Respiratory 15 16 





Rate   


 


Blood Pressure 154/76  


 


Blood Pressure   153/79





[Left]   


 


O2 Sat by Pulse 99 100 





Oximetry   














- Consultations


Consultation #1: 





07/23/19 09:00


case d/w Dr Dani COPELAND, will consult and perform endoscopy








ED Medical Decision Making





- Lab Data


Result diagrams: 


                                 07/23/19 09:15





                                 07/23/19 09:15








                                   Lab Results











  07/23/19 07/23/19 07/23/19 Range/Units





  09:15 09:15 09:15 


 


WBC  5.4    (4.5-11.0)  K/mm3


 


RBC  4.41    (3.65-5.03)  M/mm3


 


Hgb  8.2 L    (11.8-15.2)  gm/dl


 


Hct  27.8 L    (35.5-45.6)  %


 


MCV  63 L    (84-94)  fl


 


MCH  19 L    (28-32)  pg


 


MCHC  30 L    (32-34)  %


 


RDW  21.0 H    (13.2-15.2)  %


 


Plt Count  201    (140-440)  K/mm3


 


Lymph % (Auto)  8.4 L    (13.4-35.0)  %


 


Mono % (Auto)  15.2 H    (0.0-7.3)  %


 


Eos % (Auto)  0.5    (0.0-4.3)  %


 


Baso % (Auto)  1.5    (0.0-1.8)  %


 


Lymph #  0.4 L    (1.2-5.4)  K/mm3


 


Mono #  0.8    (0.0-0.8)  K/mm3


 


Eos #  0.0    (0.0-0.4)  K/mm3


 


Baso #  0.1    (0.0-0.1)  K/mm3


 


Seg Neutrophils %  74.4 H    (40.0-70.0)  %


 


Seg Neutrophils #  4.0    (1.8-7.7)  K/mm3


 


PT   13.9   (12.2-14.9)  Sec.


 


INR   1.10   (0.87-1.13)  


 


APTT   30.0   (24.2-36.6)  Sec.


 


Sodium    145  (137-145)  mmol/L


 


Potassium    4.1  (3.6-5.0)  mmol/L


 


Chloride    109.3 H  ()  mmol/L


 


Carbon Dioxide    27  (22-30)  mmol/L


 


Anion Gap    13  mmol/L


 


BUN    15  (9-20)  mg/dL


 


Creatinine    0.9  (0.8-1.5)  mg/dL


 


Estimated GFR    > 60  ml/min


 


BUN/Creatinine Ratio    17  %


 


Glucose    93  ()  mg/dL


 


Calcium    9.1  (8.4-10.2)  mg/dL














- EKG Data


-: EKG Interpreted by Me


EKG shows normal: sinus rhythm, axis (qrs 74), QRS complexes (qrsd 136), ST-T 

waves (no stemi)


Rate: normal (91)





- EKG Data


When compared to previous EKG there are: no significant change





- Medical Decision Making





Impacted food bolus removed by GI


Microcytic anemia noted. 


Liquid Iron supplementation will be provided


protonix, puree diet, and f/u as per gi recommendations





- Differential Diagnosis


esophageal foreign body, esophageal stricture


Critical Care Time: No


Critical care attestation.: 


If time is entered above; I have spent that time in minutes in the direct care 

of this critically ill patient, excluding procedure time.








ED Disposition


Clinical Impression: 


 Food impaction of esophagus, Esophageal stricture, Microcytic anemia





Disposition: DC-01 TO HOME OR SELFCARE


Is pt being admited?: No


Does the pt Need Aspirin: No


Condition: Stable


Instructions:  Esophageal Stricture (ED), Food Impaction (ED), Anemia (ED)


Additional Instructions: 


                                                Post Sedation D/C Instructions








When you return home you may resume your regular diet unless otherwise directed.

 





-Go directly home from the hospital and rest quietly. You may resume normal 

activities tomorrow.





 -Do NOT drive, return to work, operate any machinery or make any important 

personal or business decisions today. 





-Do NOT drink any alcohol or take nerve or sleeping drugs. They add to the 

effects of the medicine still present in your body. 





-Full liquid diet, slowly advance to soft(mash potato, pudding , etc.) then 

regular diet tomorrow if ok with ER Doctor





-please follow up with Gastroenterology Doctor for future dilitation








Dr. Bear


Prescriptions: 


Ferrous Sulfate [Ferrous Sulfate 220 MG/5 ML] 220 mg PO DAILY 30 Days  solution


Pantoprazole [Protonix TAB] 20 mg PO QDAY #30 tablet.


Referrals: 


WINIFRED MORROW MD [Staff Physician] - 3-5 Days


(primary care doctor


)


LOREE BEAR MD [Staff Physician] - 7-10 days


(GI doctor


)


Time of Disposition: 13:43

## 2019-07-23 NOTE — XRAY REPORT
CHEST 1 VIEW 



INDICATION / CLINICAL INFORMATION:

esophageal foreign body.



COMPARISON: 

None available.



FINDINGS:



SUPPORT DEVICES: None.



HEART / MEDIASTINUM: No significant abnormality. 



LUNGS / PLEURA: Pulmonary hyperinflation without evidence of superimposed acute disease. No pneumotho
rax. 



ADDITIONAL FINDINGS: No significant additional findings.



IMPRESSION:

1. No acute findings.



Signer Name: Lex Gustafson MD 

Signed: 7/23/2019 9:16 AM

 Workstation Name: [id number]

## 2019-07-23 NOTE — OPERATIVE REPORT
Operative Report


Operative Report: 





DOS: 7/23/19


 


SURGEON: Conrad Louise MD


 


EGD REPORT


 


PREOPERATIVE DIAGNOSIS and POSTOPERATIVE DIAGNOSIS: Food impaction


 


ESTIMATED BLOOD LOSS: minimal


 


DESCRIPTION OF PROCEDURE: A high-resolution EGD scope was passed through the 

oropharynx, esophagus, stomach, and second portion of duodenum. The scope was 

carefully withdrawn. Retroflexion was performed in the stomach. At the end of 

the procedure, the scope was cleaned using normal technique. Vital signs 

monitored continuously throughout.


 


SEDATION: Provided by [facility].


 


COMPLICATIONS: None.


 


FINDINGS: 


* Bolus of food in the proximal esophagus. This was gently pushed down the 

  esophagus and into the stomach to relieve the obstruction


* Duodenum consistent with gastric surgery (most consistent with gastrojejunal 

  anastomosis) patient reported due to prior gastric ulcer


* Adherant blood in the stomach, no visible ulcers though


* Z line irregular at 45cm from the incisors


* Ulceration of the proximal esophageal mucosa from the food impaction, however 

  no perforation was present.


* There was intrinsic benign appearing stricturing of the proximal esophagus, 

  about 5cm in length, just distal to where the food impaction was located


* There appeared to be a small Zenker's diverticulum in the proximal esophagus





 


RECOMMENDATIONS: 


* Discharge home on daily protonix 20 mg daily


* Puree consistency diet until cleared by me for regular diet


* Return to my clinic in 1-2 weeks and patient will need repeat EGD with 

  dilation of the proximal esophageal stricture


* Will also, also an outpatient, order esophagram to confirm the Zenker's 

  diverticulum

## 2019-07-23 NOTE — XRAY REPORT
Soft tissue neck. 7/23/2019.



HISTORY: Esophageal foreign body.



FINDINGS: Negative for prevertebral soft tissue swelling, significant airway narrowing or radiopaque 
foreign body.



Signer Name: Balwinder Chavira MD 

Signed: 7/23/2019 9:18 AM

 Workstation Name: [id number]

## 2019-10-21 ENCOUNTER — HOSPITAL ENCOUNTER (INPATIENT)
Dept: HOSPITAL 5 - ED | Age: 74
LOS: 4 days | Discharge: HOME HEALTH | DRG: 308 | End: 2019-10-25
Attending: INTERNAL MEDICINE | Admitting: INTERNAL MEDICINE
Payer: MEDICARE

## 2019-10-21 DIAGNOSIS — I48.0: Primary | ICD-10-CM

## 2019-10-21 DIAGNOSIS — F17.200: ICD-10-CM

## 2019-10-21 DIAGNOSIS — I42.0: ICD-10-CM

## 2019-10-21 DIAGNOSIS — Z91.14: ICD-10-CM

## 2019-10-21 DIAGNOSIS — Z79.01: ICD-10-CM

## 2019-10-21 DIAGNOSIS — E05.90: ICD-10-CM

## 2019-10-21 DIAGNOSIS — J44.1: ICD-10-CM

## 2019-10-21 DIAGNOSIS — K21.9: ICD-10-CM

## 2019-10-21 DIAGNOSIS — I50.21: ICD-10-CM

## 2019-10-21 DIAGNOSIS — E43: ICD-10-CM

## 2019-10-21 DIAGNOSIS — Z87.11: ICD-10-CM

## 2019-10-21 DIAGNOSIS — G43.909: ICD-10-CM

## 2019-10-21 DIAGNOSIS — Z71.6: ICD-10-CM

## 2019-10-21 DIAGNOSIS — F17.210: ICD-10-CM

## 2019-10-21 LAB
APTT BLD: 37.5 SEC. (ref 24.2–36.6)
BASOPHILS # (AUTO): 0 K/MM3 (ref 0–0.1)
BASOPHILS NFR BLD AUTO: 0.3 % (ref 0–1.8)
BILIRUB UR QL STRIP: (no result)
BLOOD UR QL VISUAL: (no result)
BUN SERPL-MCNC: 19 MG/DL (ref 9–20)
BUN/CREAT SERPL: 19 %
CALCIUM SERPL-MCNC: 8.7 MG/DL (ref 8.4–10.2)
EOSINOPHIL # BLD AUTO: 0 K/MM3 (ref 0–0.4)
EOSINOPHIL NFR BLD AUTO: 0.6 % (ref 0–4.3)
HCT VFR BLD CALC: 28.5 % (ref 35.5–45.6)
HEMOLYSIS INDEX: 0
HGB BLD-MCNC: 8.6 GM/DL (ref 11.8–15.2)
INR PPP: 1.18 (ref 0.87–1.13)
LYMPHOCYTES # BLD AUTO: 1.2 K/MM3 (ref 1.2–5.4)
LYMPHOCYTES NFR BLD AUTO: 23.5 % (ref 13.4–35)
MCHC RBC AUTO-ENTMCNC: 30 % (ref 32–34)
MCV RBC AUTO: 68 FL (ref 84–94)
MONOCYTES # (AUTO): 0.7 K/MM3 (ref 0–0.8)
MONOCYTES % (AUTO): 14.1 % (ref 0–7.3)
PH UR STRIP: 6 [PH] (ref 5–7)
PLATELET # BLD: 149 K/MM3 (ref 140–440)
PROT UR STRIP-MCNC: (no result) MG/DL
RBC # BLD AUTO: 4.2 M/MM3 (ref 3.65–5.03)
RBC #/AREA URNS HPF: 1 /HPF (ref 0–6)
UROBILINOGEN UR-MCNC: < 2 MG/DL (ref ?–2)
WBC #/AREA URNS HPF: 2 /HPF (ref 0–6)

## 2019-10-21 PROCEDURE — 83735 ASSAY OF MAGNESIUM: CPT

## 2019-10-21 PROCEDURE — 76536 US EXAM OF HEAD AND NECK: CPT

## 2019-10-21 PROCEDURE — 96365 THER/PROPH/DIAG IV INF INIT: CPT

## 2019-10-21 PROCEDURE — 96375 TX/PRO/DX INJ NEW DRUG ADDON: CPT

## 2019-10-21 PROCEDURE — 84443 ASSAY THYROID STIM HORMONE: CPT

## 2019-10-21 PROCEDURE — 96372 THER/PROPH/DIAG INJ SC/IM: CPT

## 2019-10-21 PROCEDURE — 81001 URINALYSIS AUTO W/SCOPE: CPT

## 2019-10-21 PROCEDURE — 36415 COLL VENOUS BLD VENIPUNCTURE: CPT

## 2019-10-21 PROCEDURE — 85610 PROTHROMBIN TIME: CPT

## 2019-10-21 PROCEDURE — 84484 ASSAY OF TROPONIN QUANT: CPT

## 2019-10-21 PROCEDURE — 80048 BASIC METABOLIC PNL TOTAL CA: CPT

## 2019-10-21 PROCEDURE — 99406 BEHAV CHNG SMOKING 3-10 MIN: CPT

## 2019-10-21 PROCEDURE — 84439 ASSAY OF FREE THYROXINE: CPT

## 2019-10-21 PROCEDURE — 82962 GLUCOSE BLOOD TEST: CPT

## 2019-10-21 PROCEDURE — 71045 X-RAY EXAM CHEST 1 VIEW: CPT

## 2019-10-21 PROCEDURE — 93306 TTE W/DOPPLER COMPLETE: CPT

## 2019-10-21 PROCEDURE — 85025 COMPLETE CBC W/AUTO DIFF WBC: CPT

## 2019-10-21 PROCEDURE — 87086 URINE CULTURE/COLONY COUNT: CPT

## 2019-10-21 PROCEDURE — 78012 THYROID UPTAKE MEASUREMENT: CPT

## 2019-10-21 PROCEDURE — 85730 THROMBOPLASTIN TIME PARTIAL: CPT

## 2019-10-21 PROCEDURE — 82140 ASSAY OF AMMONIA: CPT

## 2019-10-21 PROCEDURE — 83880 ASSAY OF NATRIURETIC PEPTIDE: CPT

## 2019-10-21 PROCEDURE — 87040 BLOOD CULTURE FOR BACTERIA: CPT

## 2019-10-21 PROCEDURE — 86592 SYPHILIS TEST NON-TREP QUAL: CPT

## 2019-10-21 PROCEDURE — 87116 MYCOBACTERIA CULTURE: CPT

## 2019-10-21 PROCEDURE — 93005 ELECTROCARDIOGRAM TRACING: CPT

## 2019-10-21 PROCEDURE — G0378 HOSPITAL OBSERVATION PER HR: HCPCS

## 2019-10-21 PROCEDURE — 80320 DRUG SCREEN QUANTALCOHOLS: CPT

## 2019-10-21 PROCEDURE — A9516 IODINE I-123 SOD IODIDE MIC: HCPCS

## 2019-10-21 PROCEDURE — G0480 DRUG TEST DEF 1-7 CLASSES: HCPCS

## 2019-10-21 PROCEDURE — 93010 ELECTROCARDIOGRAM REPORT: CPT

## 2019-10-21 PROCEDURE — 94760 N-INVAS EAR/PLS OXIMETRY 1: CPT

## 2019-10-21 RX ADMIN — ACETAMINOPHEN SCH MG: 500 TABLET ORAL at 22:32

## 2019-10-21 RX ADMIN — Medication SCH ML: at 22:31

## 2019-10-21 RX ADMIN — ENOXAPARIN SODIUM SCH MG: 100 INJECTION SUBCUTANEOUS at 22:31

## 2019-10-21 RX ADMIN — ACETAMINOPHEN SCH: 500 TABLET ORAL at 23:37

## 2019-10-21 RX ADMIN — ONDANSETRON PRN MG: 2 INJECTION INTRAMUSCULAR; INTRAVENOUS at 22:31

## 2019-10-21 NOTE — HISTORY AND PHYSICAL REPORT
History of Present Illness


Chief complaint: 





My chest feels tight, and I'm short of breath.


History of present illness: 


73 YO Male with Severe Malnutrition, GERD, OA, Nicotine Dependence, PUD, Anemia,

Migraine HA, Medication Noncompliance presents to ED for evaluation. Pt states 

that he has experienced chest palpitations, and shortness of breath over the 

past 3 days with persistent symptoms over the same time frame. Pt transported to

[facility] via private vehicle. Pt seen and evaluated in ED and found to have New onset

Atrial Fib with RVR, as well as symptoms consistent with Diastolic CHF. Pt 

noncompliant with outpatient medication. Pt initiated on therapeutic 

anticoagulation. CHADS 2 VAsc Score:2. Cardiology consulted in ED. Pt denies 

fever, chills, CP, NVD, Trauma, BRBPR, Productive cough, skin rash, prolonged tr

miri/immobility, unilateral leg swelling, calf pain, Individual/Family history 

of DVT/PE/Bleeding/Blood Clotting Disorders. Pt is unsure of his outpatient 

medications and has not taken his medication for the past 3 months. PT admitted 

to ACE unit and placed on telemetry monitoring. 





Past History


Past Medical History: other (see hpi)


Past Surgical History: bowel surgery, Other (hemorrhoid surgery)


Social history: 


Family history: no significant family history (reviewed)





Medications and Allergies


                                    Allergies











Allergy/AdvReac Type Severity Reaction Status Date / Time


 


No Known Allergies Allergy   Verified 01/19/19 12:35











                                Home Medications











 Medication  Instructions  Recorded  Confirmed  Last Taken  Type


 


Acetaminophen [Non-Aspirin Extra 500 mg PO Q6H 10/21/19 10/21/19 10/20/19 

History





Strength]     


 


Esomeprazole Magnesium [NexIUM] 40 mg PO QDAY 10/21/19 10/21/19 10/20/19 History











Active Meds: 


Active Medications





Doxycycline Hyclate 100 mg/ (Sodium Chloride)  250 mls @ 250 mls/hr IV ONCE ONE;

 Protocol


   Stop: 10/21/19 17:23











Review of Systems


Constitutional: fatigue, weakness, no weight loss, no weight gain, no fever, no 

chills


Ears, nose, mouth and throat: no ear pain, no ear discharge, no tinnitis, no 

decreased hearing, no nose pain


Cardiovascular: rapid/irregular heart beat, shortness of breath, no chest pain, 

no edema, no syncope


Respiratory: no cough, no cough with sputum, no excessive sputum, no hemoptysis


Gastrointestinal: no abdominal pain, no nausea, no vomiting, no diarrhea


Genitourinary Male: no hematuria, no flank pain, no discharge, no urinary 

frequency, no urinary hesitancy


Rectal: no pain, no incontinence, no bleeding


Musculoskeletal: no neck stiffness, no neck pain, no shooting arm pain, no arm 

numbness/tingling, no low back pain


Integumentary: no rash, no pruritis, no redness, no sores, no wounds


Neurological: no transient paralysis, no paralysis, no weakness, no parathesias,

 no numbness


Psychiatric: no anxiety, no memory loss, no change in sleep habits, no sleep 

disturbances, no insomnia, no hypersomnia


Endocrine: no cold intolerance, no heat intolerance, no polyphagia, no excessive

 thirst, no polydipsia, no polyuria


Hematologic/Lymphatic: no easy bruising, no easy bleeding, no lymphadenopathy, 

no lymphedema


Allergic/Immunologic: no urticaria, no allergic rhinitis, no wheezing, no 

persistent infections, no anaphylaxis, no angioedema





Exam





- Constitutional


Vitals: 


                                        











Temp Pulse Resp BP Pulse Ox


 


 97.5 F L  125 H  16   140/94   95 


 


 10/21/19 13:20  10/21/19 15:40  10/21/19 13:20  10/21/19 15:40  10/21/19 13:20











General appearance: Present: mild distress, cachectic





- EENT


Eyes: Present: PERRL


ENT: hearing intact, clear oral mucosa





- Neck


Neck: Present: supple, normal ROM





- Respiratory


Respiratory effort: normal


Respiratory: bilateral: CTA





- Cardiovascular


Rhythm: irregularly irregular


Heart Sounds: Present: S1 & S2.  Absent: rub, click





- Extremities


Extremities: pulses symmetrical, No edema


Peripheral Pulses: within normal limits





- Abdominal


General gastrointestinal: Present: soft, non-tender, non-distended, normal bowel

 sounds


Male genitourinary: Present: normal





- Integumentary


Integumentary: Present: clear, warm, dry





- Musculoskeletal


Musculoskeletal: gait normal, strength equal bilaterally





- Psychiatric


Psychiatric: appropriate mood/affect, intact judgment & insight





- Neurologic


Neurologic: CNII-XII intact, moves all extremities





Results





- Labs


CBC & Chem 7: 


                                 10/21/19 14:32





                                 10/21/19 14:32


Labs: 


                              Abnormal lab results











  10/21/19 10/21/19 10/21/19 Range/Units





  14:32 14:32 14:32 


 


Hgb  8.6 L    (11.8-15.2)  gm/dl


 


Hct  28.5 L    (35.5-45.6)  %


 


MCV  68 L    (84-94)  fl


 


MCH  21 L    (28-32)  pg


 


MCHC  30 L    (32-34)  %


 


RDW  21.9 H    (13.2-15.2)  %


 


Mono % (Auto)  14.1 H    (0.0-7.3)  %


 


INR    1.18 H  (0.87-1.13)  


 


APTT    37.5 H  (24.2-36.6)  Sec.


 


Chloride   109.5 H   ()  mmol/L


 


Carbon Dioxide   20 L   (22-30)  mmol/L














Assessment and Plan





- Patient Problems


(1) Atrial fibrillation with RVR


Current Visit: Yes   Status: Acute   


Plan to address problem: 


Cardizem for rate control, cardiology consulted, therapeutic anticoagulation. 

remote telemetry. Pt cardioverted S/P loading dose of cardizem. 








(2) CHF (congestive heart failure)


Current Visit: Yes   Status: Suspected   


Qualifiers: 


   Heart failure chronicity: acute 


Plan to address problem: 


Cardiology consulted, strict I/O, daily weight, monitor uop q shift, pulse 

oximetry, bnp, chest x ray, supportive care. 








(3) Severe malnutrition


Current Visit: Yes   Status: Acute   


Plan to address problem: 


Encourage increased protein intake, dietary supplementation








(4) COPD (chronic obstructive pulmonary disease)


Current Visit: Yes   Status: Acute   


Qualifiers: 


   Chronic bronchitis type: mixed simple and mucopurulent 


Plan to address problem: 


supplemental oxygen, nebulizer therapy, NIPPV as clinically indicated. 








(5) Non compliance w medication regimen


Current Visit: Yes   Status: Acute   


Plan to address problem: 


Behavior change counseling, +15 minutes, 








(6) DVT prophylaxis


Current Visit: Yes   Status: Acute   


Plan to address problem: 


SCD to BLE while in bed, therapeutic anticoagulation

## 2019-10-21 NOTE — XRAY REPORT
CHEST 1 VIEW



INDICATION: 

Chest Pain.



COMPARISON: 

7/23/2019.



FINDINGS:



Support devices: None.



Heart: Normal. 



Lungs/Pleura: Advanced emphysematous changes are noted. There are increased interstitial markings in 
both lower lungs on today's exam with small effusions.  







IMPRESSION:

1. Increased interstitial markings and small effusions could be due to pulmonary edema/mild congestiv
e failure superimposed on COPD. However, the interstitial opacities and small effusions are nonspecif
ic. Follow-up is recommended.



Signer Name: Calvin West MD 

Signed: 10/21/2019 1:47 PM

 Workstation Name: [id number]

## 2019-10-21 NOTE — EMERGENCY DEPARTMENT REPORT
Blank Doc





- Documentation


Documentation: 





74-year-old male that presents with chest pain and sob.





This initial assessment/diagnostic orders/clinical plan/treatment(s) is/are 

subject to change based on patient's health status, clinical progression and re-

assessment by fellow clinical providers in the ED.  Further treatment and workup

at subsequent clinical providers discretion.  Patient/guardians urged not to 

elope from the ED as their condition may be serious if not clinically assessed 

and managed.  Initial orders include:


1- Patient sent to MAIN ED for further evaluation and treatment


2- labs


3- EKG


4- CXR

## 2019-10-21 NOTE — EMERGENCY DEPARTMENT REPORT
ED General Adult HPI





- General


Chief complaint: Chest Pain


Stated complaint: CHEST PAIN


Time Seen by Provider: 10/21/19 13:30


Source: patient, RN notes reviewed, old records reviewed


Mode of arrival: Ambulatory


Limitations: Other (the patient is a poor historian)





- History of Present Illness


Initial comments: 





This is a 74-year-old gentleman.  This patient is not known to this provider 

previously.  The patient is a poor historian.  Reportedly has a history of GERD,

esophageal stenosis, food impaction





May also have a history of migraines and peptic ulcer disease.





The patient presents to the ER with a complaint of chest pain and palpitations 

and shortness of breath.  Today is Monday.  His pain started 2-3 days ago.  He 

is not having pain at this time.  He does not radiate anywhere.  He has a cough.

 He denies hematemesis.  He denies bright red blood per rectum.  He states that 

he has been out of his medicines for the past 3-4 months.  He denies DVT and 

pulmonary embolus risk factors.  He denies recent aspirin consumption.  He 

denies recent cocaine consumption.


-: Gradual


Location: chest


Radiation: non-radiation


Quality: aching


Consistency: intermittent


Improves with: none


Worsens with: none





- Related Data


                                Home Medications











 Medication  Instructions  Recorded  Confirmed  Last Taken


 


Acetaminophen [Non-Aspirin Extra 500 mg PO Q6H 10/21/19 10/21/19 10/20/19





Strength]    


 


Esomeprazole Magnesium [NexIUM] 40 mg PO QDAY 10/21/19 10/21/19 10/20/19











                                    Allergies











Allergy/AdvReac Type Severity Reaction Status Date / Time


 


No Known Allergies Allergy   Verified 19 12:35














ED Review of Systems


ROS: 


Stated complaint: CHEST PAIN


Other details as noted in HPI





Constitutional: malaise, weakness


ENT: congestion


Respiratory: shortness of breath


Cardiovascular: chest pain, palpitations


Gastrointestinal: denies: hematemesis, melena, hematochezia


Genitourinary: denies: dysuria


Musculoskeletal: denies: back pain


Skin: denies: lesions


Neurological: weakness





ED Past Medical Hx





- Past Medical History


Previous Medical History?: Yes


Hx Hypertension: No


Hx Heart Attack/AMI: No


Hx GERD: Yes


Hx Arthritis: Yes


Hx Headaches / Migraines: Yes (MIGRAINES)


Additional medical history: Peptic ulcer disease status post surgery.  ANEMIA





- Surgical History


Past Surgical History?: Yes


Additional Surgical History: Peptic ulcer disease surgery, hemorrhoidectomy





- Social History


Smoking Status: Current Every Day Smoker


Substance Use Type: None





- Medications


Home Medications: 


                                Home Medications











 Medication  Instructions  Recorded  Confirmed  Last Taken  Type


 


Acetaminophen [Non-Aspirin Extra 500 mg PO Q6H 10/21/19 10/21/19 10/20/19 

History





Strength]     


 


Esomeprazole Magnesium [NexIUM] 40 mg PO QDAY 10/21/19 10/21/19 10/20/19 History














ED Physical Exam





- General


Limitations: No Limitations


General appearance: alert, in no apparent distress





- Head


Head exam: Present: atraumatic, normocephalic





- Eye


Eye exam: Present: normal appearance, EOMI.  Absent: nystagmus





- ENT


ENT exam: Present: normal exam, normal orophraynx, mucous membranes moist, no

rmal external ear exam





- Neck


Neck exam: Present: normal inspection, full ROM.  Absent: tenderness, 

meningismus





- Respiratory


Respiratory exam: Present: decreased breath sounds.  Absent: respiratory 

distress, wheezes, rales, rhonchi, stridor





- Cardiovascular


Cardiovascular Exam: Present: tachycardia, irregular rhythm, normal heart 

sounds, JVD.  Absent: systolic murmur, diastolic murmur, rubs, gallop





- GI/Abdominal


GI/Abdominal exam: Present: soft.  Absent: distended, tenderness, guarding, 

rebound, rigid, pulsatile mass





- Rectal


Rectal exam: Present: deferred





- Extremities Exam


Extremities exam: Present: normal inspection, full ROM, other (2+ pulses noted 

in the bilateral upper, lower extremities.  There is no long bone tenderness.  

Musculoskeletal compartments are soft.  The pelvis is stable.).  Absent: pedal 

edema, calf tenderness





- Back Exam


Back exam: Present: normal inspection, full ROM.  Absent: tenderness, CVA 

tenderness (R), CVA tenderness (L), paraspinal tenderness, vertebral tenderness





- Neurological Exam


Neurological exam: Present: alert, other (there is no facial droop.  The tongue 

is midline.  Extraocular movements are intact bilaterally.  Patient speaking in 

full complete sentences.  Shoulder shrug is intact bilaterally.  Hearing is 

grossly intact bilaterally.  Visual acuity intact to finger counting and color 

perception at a close distance.  5/5 strength 4 extremities.  Sensation intact 

to light touch in 4 extremities.).  Absent: motor sensory deficit





- Psychiatric


Psychiatric exam: Present: flat affect





- Skin


Skin exam: Present: warm, dry, intact, normal color.  Absent: rash





ED Course


                                   Vital Signs











  10/21/19 10/21/19





  13:20 15:40


 


Temperature 97.5 F L 


 


Pulse Rate 65 125 H


 


Respiratory 16 





Rate  


 


Blood Pressure 130/90 140/94


 


O2 Sat by Pulse 95 





Oximetry  














ED Medical Decision Making





- Lab Data


Result diagrams: 


                                 10/21/19 14:32





                                 10/21/19 14:32








                                   Vital Signs











  10/21/19 10/21/19





  13:20 15:40


 


Temperature 97.5 F L 


 


Pulse Rate 65 125 H


 


Respiratory 16 





Rate  


 


Blood Pressure 130/90 140/94


 


O2 Sat by Pulse 95 





Oximetry  











                                   Lab Results











  10/21/19 10/21/19 10/21/19 Range/Units





  14:32 14:32 14:32 


 


WBC  5.0    (4.5-11.0)  K/mm3


 


RBC  4.20    (3.65-5.03)  M/mm3


 


Hgb  8.6 L    (11.8-15.2)  gm/dl


 


Hct  28.5 L    (35.5-45.6)  %


 


MCV  68 L    (84-94)  fl


 


MCH  21 L    (28-32)  pg


 


MCHC  30 L    (32-34)  %


 


RDW  21.9 H    (13.2-15.2)  %


 


Plt Count  149    (140-440)  K/mm3


 


Lymph % (Auto)  23.5    (13.4-35.0)  %


 


Mono % (Auto)  14.1 H    (0.0-7.3)  %


 


Eos % (Auto)  0.6    (0.0-4.3)  %


 


Baso % (Auto)  0.3    (0.0-1.8)  %


 


Lymph #  1.2    (1.2-5.4)  K/mm3


 


Mono #  0.7    (0.0-0.8)  K/mm3


 


Eos #  0.0    (0.0-0.4)  K/mm3


 


Baso #  0.0    (0.0-0.1)  K/mm3


 


Seg Neutrophils %  61.5    (40.0-70.0)  %


 


Seg Neutrophils #  3.1    (1.8-7.7)  K/mm3


 


PT    14.7  (12.2-14.9)  Sec.


 


INR    1.18 H  (0.87-1.13)  


 


APTT    37.5 H  (24.2-36.6)  Sec.


 


Sodium   145   (137-145)  mmol/L


 


Potassium   4.2   (3.6-5.0)  mmol/L


 


Chloride   109.5 H   ()  mmol/L


 


Carbon Dioxide   20 L   (22-30)  mmol/L


 


Anion Gap   20   mmol/L


 


BUN   19   (9-20)  mg/dL


 


Creatinine   1.0   (0.8-1.5)  mg/dL


 


Estimated GFR   > 60   ml/min


 


BUN/Creatinine Ratio   19   %


 


Glucose   86   ()  mg/dL


 


Calcium   8.7   (8.4-10.2)  mg/dL


 


Troponin T   < 0.010   (0.00-0.029)  ng/mL














- EKG Data


-: EKG Interpreted by Me


Rate: tachycardia





- EKG Data





10/21/19 16:11


EKG #1 is not consistent with ST elevation myocardial infarction.  It shows A. 

fib, RVR, LVH, QTC prolonged, nonspecific ST T abnormalities, the EKG is 

abnormal, the EKG is not consistent with ST elevation myocardial infarction.  

When compared to prior EKG from 2019, A. fib appears to be new.  LVH is 

old.  Nonspecific findings are old.











- Radiology Data


Radiology results: report reviewed, image reviewed





Print Report











Referring Physician: SABA SHELBY 


 


Patient Name: HOSSEIN CLARK 


 


Patient ID: [id number] 


 


YOB: 1945 


 


Sex: Male 


 


Accession: [id number] 


 


Report Date: 2019-10-21 


 


Report Status: Finalized 


 


Findings


[facility] [address] [place] [address] 

XRay Report Signed Patient: HOSSEIN CLARK MR# : [id number] : 

1945 Acct:[id number] Age/Sex: 74 / M ADM Date: 10/21/19 Loc: ED 

Attending Dr: Ordering Physician: SABA SHELBY MD Date of Service: 10/21/19 

Procedure(s): XR chest 1V ap Accession Number(s): [id number] cc: SABA SHELBY MD Fluoro 

Time In Minutes: CHEST 1 VIEW INDICATION: Chest Pain. COMPARISON: 2019. 

FINDINGS: Support devices: None. Heart: Normal. Lungs/Pleura: Advanced 

emphysematous changes are noted. There are increased interstitial markings in 

both lower lungs on today's exam with small effusions. IMPRESSION: 1. Increased 

interstitial markings and small effusions could be due to pulmonary edema/mild 

congestive failure superimposed on COPD. However, the interstitial opacities and

 small effusions are nonspecific. Follow-up is recommended. Signer Name: Calvin West MD Signed: 10/21/2019 1:47 PM Workstation Name: [id number] Transcribed By:

 MARIELENA Dictated By: Calvin West MD Electronically Authenticated By: Calvin West MD Signed Date/Time: 10/21/19 5357   














- Medical Decision Making





Differential diagnosis, including not limited to: A. fib with RVR, medication 

noncompliance, pneumonia, COPD, congestive heart failure, acute coronary 

syndrome





Assessment and plan: 74-year-old gentleman with resolved chest pain, found to be

 in A. fib with RVR.  He has diminished breath sounds in my exam, but bilateral 

JVD.  X-ray the chest suggest interstitial findings, small pulmonary effusions. 

 He is not wheezing on my exam.  He is given diltiazem 10 mg, then 25 mg.  Heart

 rate decreased from 120 bpm to the 70s and 80s.  He is still currently in A. 

fib.  He is loaded with Lovenox.  He'll be treated empirically with Lasix, and 

metoprolol.  We've recommended admission to the medical service for further 

evaluation, as the patient is moderate risk for major adverse cardiac event by 

the heart score.  He does not endorse any DVT or pulmonary embolism risk 

factors, and he is low risk by well's criteria.  The case is presented to the 

Hospital physician, Dr. Patel, who has accepted the patient to the medical 

service.








Critical Care Time: Yes


Critical care time in (mins) excluding proc time.: 35


Critical care attestation.: 


If time is entered above; I have spent that time in minutes in the direct care 

of this critically ill patient, excluding procedure time.








ED Disposition


Clinical Impression: 


 Atrial fibrillation with RVR, Acute chest pain, Non compliance w medication 

regimen





Disposition:  OP ADMIT IP TO THIS HOSP


Is pt being admited?: Yes


Does the pt Need Aspirin: Yes


Condition: Fair


Instructions:  Chest Pain (ED)


Referrals: 


JOSE DAVID TO [Other] - 3-5 Days

## 2019-10-22 RX ADMIN — DILTIAZEM HYDROCHLORIDE SCH MG: 30 TABLET, FILM COATED ORAL at 05:35

## 2019-10-22 RX ADMIN — PROPRANOLOL HYDROCHLORIDE SCH MG: 10 TABLET ORAL at 16:53

## 2019-10-22 RX ADMIN — METHIMAZOLE SCH MG: 5 TABLET ORAL at 18:38

## 2019-10-22 RX ADMIN — APIXABAN SCH MG: 2.5 TABLET, FILM COATED ORAL at 21:13

## 2019-10-22 RX ADMIN — ACETAMINOPHEN SCH MG: 500 TABLET ORAL at 10:00

## 2019-10-22 RX ADMIN — Medication SCH ML: at 09:57

## 2019-10-22 RX ADMIN — PROPRANOLOL HYDROCHLORIDE SCH MG: 10 TABLET ORAL at 21:13

## 2019-10-22 RX ADMIN — ONDANSETRON PRN MG: 2 INJECTION INTRAMUSCULAR; INTRAVENOUS at 21:17

## 2019-10-22 RX ADMIN — ACETAMINOPHEN SCH MG: 500 TABLET ORAL at 16:53

## 2019-10-22 RX ADMIN — METHIMAZOLE SCH MG: 5 TABLET ORAL at 21:13

## 2019-10-22 RX ADMIN — ENOXAPARIN SODIUM SCH MG: 100 INJECTION SUBCUTANEOUS at 09:56

## 2019-10-22 RX ADMIN — ACETAMINOPHEN SCH MG: 500 TABLET ORAL at 05:36

## 2019-10-22 RX ADMIN — APIXABAN SCH MG: 2.5 TABLET, FILM COATED ORAL at 13:41

## 2019-10-22 RX ADMIN — DILTIAZEM HYDROCHLORIDE SCH MG: 30 TABLET, FILM COATED ORAL at 00:58

## 2019-10-22 RX ADMIN — PANTOPRAZOLE SODIUM SCH MG: 40 TABLET, DELAYED RELEASE ORAL at 09:56

## 2019-10-22 NOTE — CONSULTATION
History of Present Illness


Consult date: 10/22/19


Consult reason: atrial fibrillation


History of present illness: 





This is a 74-year old male who presented with complaints of chest pain, found to

be in rapid atrial fibrillation of uncertain duration. Patient denies a history 

of arrhythmias. There were no reports of unusual shortness of breath. Blood 

pressure remain stable. Labs revealed a TSH of 0.005 and a free T4 at 2.45. 

Cardiology consultation has been requested.





Past History


Social history: 


Family history: no significant family history (reviewed)





Medications and Allergies


                                    Allergies











Allergy/AdvReac Type Severity Reaction Status Date / Time


 


No Known Allergies Allergy   Verified 01/19/19 12:35











                                Home Medications











 Medication  Instructions  Recorded  Confirmed  Last Taken  Type


 


Acetaminophen [Non-Aspirin Extra 500 mg PO Q6H 10/21/19 10/21/19 10/20/19 

History





Strength]     


 


Esomeprazole Magnesium [NexIUM] 40 mg PO QDAY 10/21/19 10/21/19 10/20/19 History











Active Meds: 


Active Medications





Acetaminophen (Tylenol)  650 mg PO Q4H PRN


   PRN Reason: Pain MILD(1-3)/Fever >100.5/HA


Acetaminophen (Tylenol)  500 mg PO Q6H [facility]


   Last Admin: 10/22/19 10:00 Dose:  500 mg


   Documented by: 


Diltiazem HCl (Cardizem)  30 mg PO Q6HR [facility]


   Last Admin: 10/22/19 05:35 Dose:  30 mg


   Documented by: 


Enoxaparin Sodium (Lovenox)  70 mg 1 mg/kg (70 mg) SUB-Q Q12HR [facility]


   Last Admin: 10/22/19 09:56 Dose:  70 mg


   Documented by: 


Ondansetron HCl (Zofran)  4 mg IV Q8H PRN


   PRN Reason: Nausea And Vomiting


   Last Admin: 10/21/19 22:31 Dose:  4 mg


   Documented by: 


Pantoprazole Sodium (Protonix)  40 mg PO DAILY [facility]


   Last Admin: 10/22/19 09:56 Dose:  40 mg


   Documented by: 


Sodium Chloride (Sodium Chloride Flush Syringe 10 Ml)  10 ml IV BID [facility]


   Last Admin: 10/22/19 09:57 Dose:  10 ml


   Documented by: 


Sodium Chloride (Sodium Chloride Flush Syringe 10 Ml)  10 ml IV PRN PRN


   PRN Reason: LINE FLUSH











Physical Examination


                                   Vital Signs











Temp Pulse Resp BP Pulse Ox


 


 97.5 F L  65   16   130/90   95 


 


 10/21/19 13:20  10/21/19 13:20  10/21/19 13:20  10/21/19 13:20  10/21/19 13:20











General appearance: no acute distress


HEENT: Positive: PERRL


Neck: Positive: trachea midline


Cardiac: Positive: Reg Rate and Rhythm


Lungs: Positive: Decreased Breath Sounds


Neuro: Positive: Grossly Intact


Extremities: Absent: edema





Results





                                 10/21/19 14:32





                                 10/21/19 14:32


                                   Coagulation











  10/21/19 Range/Units





  14:32 


 


PT  14.7  (12.2-14.9)  Sec.


 


INR  1.18 H  (0.87-1.13)  


 


APTT  37.5 H  (24.2-36.6)  Sec.








                                       CBC











  10/21/19 Range/Units





  14:32 


 


WBC  5.0  (4.5-11.0)  K/mm3


 


RBC  4.20  (3.65-5.03)  M/mm3


 


Hgb  8.6 L  (11.8-15.2)  gm/dl


 


Hct  28.5 L  (35.5-45.6)  %


 


Plt Count  149  (140-440)  K/mm3


 


Lymph #  1.2  (1.2-5.4)  K/mm3


 


Mono #  0.7  (0.0-0.8)  K/mm3


 


Eos #  0.0  (0.0-0.4)  K/mm3


 


Baso #  0.0  (0.0-0.1)  K/mm3








                          Comprehensive Metabolic Panel











  10/21/19 Range/Units





  14:32 


 


Sodium  145  (137-145)  mmol/L


 


Potassium  4.2  (3.6-5.0)  mmol/L


 


Chloride  109.5 H  ()  mmol/L


 


Carbon Dioxide  20 L  (22-30)  mmol/L


 


BUN  19  (9-20)  mg/dL


 


Creatinine  1.0  (0.8-1.5)  mg/dL


 


Glucose  86  ()  mg/dL


 


Calcium  8.7  (8.4-10.2)  mg/dL














Assessment and Plan





- Patient Problems


(1) Atrial fibrillation with RVR


Current Visit: Yes   Status: Acute   


Plan to address problem: 


Atrial fibrillation, uncertain duration


   TSH at 0.005; Free T4 at 2.45

## 2019-10-22 NOTE — PROGRESS NOTE
Assessment and Plan


Assessment and plan: 





(1) Atrial fibrillation with RVR


Current Visit: Yes   Status: Acute   


Plan to address problem: 


Cardizem for rate control, cardiology consulted, therapeutic anticoagulation. 

remote telemetry. Pt cardioverted S/P loading dose of cardizem.  





Hypothyroidism


- TSH <0.005 and free t4 2.45


- Patient started on methimazole


- We will do a nuclear medicine uptake scan








(2) CHF (congestive heart failure)


Current Visit: Yes   Status: Suspected   


Qualifiers: 


   Heart failure chronicity: acute 


Plan to address problem: 


Cardiology consulted, strict I/O, daily weight, monitor uop q shift, pulse 

oximetry, bnp, chest x ray, supportive care. 








(3) Severe malnutrition


Current Visit: Yes   Status: Acute   


Plan to address problem: 


Encourage increased protein intake, dietary supplementation








(4) COPD (chronic obstructive pulmonary disease)


Current Visit: Yes   Status: Acute   


Qualifiers: 


   Chronic bronchitis type: mixed simple and mucopurulent 


Plan to address problem: 


supplemental oxygen, nebulizer therapy, NIPPV as clinically indicated. 








(5) Non compliance w medication regimen


Current Visit: Yes   Status: Acute   


Plan to address problem: 


Behavior change counseling, +15 minutes, 








(6) DVT prophylaxis


Current Visit: Yes   Status: Acute   


Plan to address problem: 


SCD to BLE while in bed, therapeutic anticoagulation





History


Interval history: 





Patient was seen and evaluated this morning, patient didn't have any complaints.





Hospitalist Physical





- Physical exam


Narrative exam: 





 Not in cardiopulmonary distress. 


 The patient appeared well nourished and normally developed.


 Vital signs as documented.


 Head exam is unremarkable.


 No scleral icterus .


 Neck is without jugular venous distension, thyromegaly, or carotid bruits. 


 Lungs are clear to auscultation.


Cardiac exam reveals regular rate and  Rhythm.


Abdominal exam reveals normal bowel sounds. 


Extremities are nonedematous and both femoral and pedal pulses are normal.


CNS: Alert and oriented 3.  No focal weakness.





- Constitutional


Vitals: 


                                        











Temp Pulse Resp BP Pulse Ox


 


 97.7 F   97 H  20   125/83   94 


 


 10/22/19 13:08  10/22/19 13:08  10/22/19 13:08  10/22/19 13:08  10/22/19 14:05











General appearance: Present: no acute distress





Results





- Labs


CBC & Chem 7: 


                                 10/21/19 14:32





                                 10/21/19 14:32


Labs: 


                             Laboratory Last Values











WBC  5.0 K/mm3 (4.5-11.0)   10/21/19  14:32    


 


RBC  4.20 M/mm3 (3.65-5.03)   10/21/19  14:32    


 


Hgb  8.6 gm/dl (11.8-15.2)  L  10/21/19  14:32    


 


Hct  28.5 % (35.5-45.6)  L  10/21/19  14:32    


 


MCV  68 fl (84-94)  L  10/21/19  14:32    


 


MCH  21 pg (28-32)  L  10/21/19  14:32    


 


MCHC  30 % (32-34)  L  10/21/19  14:32    


 


RDW  21.9 % (13.2-15.2)  H  10/21/19  14:32    


 


Plt Count  149 K/mm3 (140-440)   10/21/19  14:32    


 


Lymph % (Auto)  23.5 % (13.4-35.0)   10/21/19  14:32    


 


Mono % (Auto)  14.1 % (0.0-7.3)  H  10/21/19  14:32    


 


Eos % (Auto)  0.6 % (0.0-4.3)   10/21/19  14:32    


 


Baso % (Auto)  0.3 % (0.0-1.8)   10/21/19  14:32    


 


Lymph #  1.2 K/mm3 (1.2-5.4)   10/21/19  14:32    


 


Mono #  0.7 K/mm3 (0.0-0.8)   10/21/19  14:32    


 


Eos #  0.0 K/mm3 (0.0-0.4)   10/21/19  14:32    


 


Baso #  0.0 K/mm3 (0.0-0.1)   10/21/19  14:32    


 


Seg Neutrophils %  61.5 % (40.0-70.0)   10/21/19  14:32    


 


Seg Neutrophils #  3.1 K/mm3 (1.8-7.7)   10/21/19  14:32    


 


PT  14.7 Sec. (12.2-14.9)   10/21/19  14:32    


 


INR  1.18  (0.87-1.13)  H  10/21/19  14:32    


 


APTT  37.5 Sec. (24.2-36.6)  H  10/21/19  14:32    


 


Sodium  145 mmol/L (137-145)   10/21/19  14:32    


 


Potassium  4.2 mmol/L (3.6-5.0)   10/21/19  14:32    


 


Chloride  109.5 mmol/L ()  H  10/21/19  14:32    


 


Carbon Dioxide  20 mmol/L (22-30)  L  10/21/19  14:32    


 


Anion Gap  20 mmol/L  10/21/19  14:32    


 


BUN  19 mg/dL (9-20)   10/21/19  14:32    


 


Creatinine  1.0 mg/dL (0.8-1.5)   10/21/19  14:32    


 


Estimated GFR  > 60 ml/min  10/21/19  14:32    


 


BUN/Creatinine Ratio  19 %  10/21/19  14:32    


 


Glucose  86 mg/dL ()   10/21/19  14:32    


 


Lactic Acid  1.80 mmol/L (0.7-2.0)   10/21/19  16:16    


 


Calcium  8.7 mg/dL (8.4-10.2)   10/21/19  14:32    


 


Magnesium  2.20 mg/dL (1.7-2.3)   10/21/19  20:08    


 


Troponin T  < 0.010 ng/mL (0.00-0.029)   10/21/19  20:08    


 


NT-Pro-B Natriuret Pep  5264 pg/mL (0-900)  H  10/21/19  16:16    


 


TSH  < 0.005 mlU/mL (0.270-4.200)  L  10/21/19  16:16    


 


Free T4  2.45 ng/dL (0.76-1.46)  H  10/21/19  16:16    


 


Urine Color  Yellow  (Yellow)   10/21/19  16:30    


 


Urine Turbidity  Clear  (Clear)   10/21/19  16:30    


 


Urine pH  6.0  (5.0-7.0)   10/21/19  16:30    


 


Ur Specific Gravity  1.014  (1.003-1.030)   10/21/19  16:30    


 


Urine Protein  <15 mg/dl mg/dL (Negative)   10/21/19  16:30    


 


Urine Glucose (UA)  Neg mg/dL (Negative)   10/21/19  16:30    


 


Urine Ketones  Neg mg/dL (Negative)   10/21/19  16:30    


 


Urine Blood  Neg  (Negative)   10/21/19  16:30    


 


Urine Nitrite  Neg  (Negative)   10/21/19  16:30    


 


Urine Bilirubin  Neg  (Negative)   10/21/19  16:30    


 


Urine Urobilinogen  < 2.0 mg/dL (<2.0)   10/21/19  16:30    


 


Ur Leukocyte Esterase  Tr  (Negative)   10/21/19  16:30    


 


Urine WBC (Auto)  2.0 /HPF (0.0-6.0)   10/21/19  16:30    


 


Urine RBC (Auto)  1.0 /HPF (0.0-6.0)   10/21/19  16:30    


 


U Epithel Cells (Auto)  < 1.0 /HPF (0-13.0)   10/21/19  16:30    


 


Salicylates  < 0.3 mg/dL (2.8-20.0)  L  10/21/19  07:28    


 


Acetaminophen  < 5.0 ug/mL (10.0-30.0)  L  10/21/19  07:28    


 


Plasma/Serum Alcohol  < 0.01 % (0-0.07)   10/21/19  16:16    














Active Medications





- Current Medications


Current Medications: 














Generic Name Dose Route Start Last Admin





  Trade Name Freq  PRN Reason Stop Dose Admin


 


Acetaminophen  650 mg  10/21/19 16:30 





  Tylenol  PO  





  Q4H PRN  





  Pain MILD(1-3)/Fever >100.5/HA  


 


Acetaminophen  500 mg  10/21/19 17:00  10/22/19 10:00





  Tylenol  PO   500 mg





  Q6H CLAUDIA   Administration


 


Apixaban  2.5 mg  10/22/19 12:00  10/22/19 13:41





  Eliquis  PO   2.5 mg





  Q12HR CLAUDIA   Administration





  Protocol  


 


Methimazole  5 mg  10/22/19 15:00 





  Tapazole  PO  





  Q8HR CLAUDIA  


 


Ondansetron HCl  4 mg  10/21/19 16:30  10/21/19 22:31





  Zofran  IV   4 mg





  Q8H PRN   Administration





  Nausea And Vomiting  


 


Pantoprazole Sodium  40 mg  10/22/19 10:00  10/22/19 09:56





  Protonix  PO   40 mg





  DAILY CLAUDIA   Administration


 


Propranolol HCl  10 mg  10/22/19 14:00 





  Inderal  PO  





  Q8HR CLAUDIA  


 


Sodium Chloride  10 ml  10/21/19 22:00  10/22/19 09:57





  Sodium Chloride Flush Syringe 10 Ml  IV   10 ml





  BID CLAUDIA   Administration


 


Sodium Chloride  10 ml  10/21/19 16:30 





  Sodium Chloride Flush Syringe 10 Ml  IV  





  PRN PRN  





  LINE FLUSH  














Nutrition/Malnutrition Assess





- Dietary Evaluation


Nutrition/Malnutrition Findings: 


                                        





Nutrition Notes                                            Start:  10/22/19 

10:06


Freq:                                                      Status: Active       




Protocol:                                                                       




 Document     10/22/19 10:06  AP  (Rec: 10/22/19 11:43  AP  [id number])


 Co-Sign      10/22/19 10:06  KH


 Nutrition Notes


     Need for Assessment generated from:         RN Referral


     Initial or Follow up                        Assessment


     Current Diagnosis                           COPD


     Other Pertinent Diagnosis                   Pneu, GERD, Anemia, CHF, A-Fib


     Current Diet                                Consistent CHO/Cardiac


     Labs/Tests                                  Reviewed


     Pertinent Medications                       Reviewed


     Height                                      6 ft 3 in


     Weight                                      48.3 kg


     Ideal Body Weight (kg)                      89.09


     BMI                                         13.3


     Intake Prior to Admission                   Poor


     Weight Status                               Emaciated


     Subjective/Other Information                Screen for MST. Pt states he


                                                 has a poor appetite. Pt


                                                 consumed 70% of bfast tray. Pt


                                                 states he has lost 40 pounds


                                                 in 3 months. Noted moderate


                                                 clavicle/temporal wasting.


     Burn                                        Absent


     Trauma                                      Absent


     Minimum of two criteria                     Yes


     Interpretation of Weight Loss (severe)      >7.5% in 3 months


     Body Fat Depletion                          Moderate depletion (severe)


     Muscle Mass                                 Moderate Depletion (severe)


     #1


      Nutrition Diagnosis                        Malnutrition


      Etiology                                   CHF


      As Evidenced by Signs and Symptoms         >7.5% weight loss in 3 months


                                                 Moderate muscle mass and body


                                                 fat depletion.


     Is patient on ventilator?                   No


     Is Patient Ambulatory and/or Out of Bed     Yes


     REE-([facility]-ambulatory/OOB) [     1694.719


      [email or web address]]                               


     Kcal/Kg value to use for calculation        45


     Approximate Energy Requirements Using       2174


      kcal/Kg                                    


     Calculation Used for Recommendations        Kcal/kg


     Additional Notes                            PRO needs: 106-133g/day (1.2-1


                                                 .5g/kg/IBW 89 kg)


                                                 Fluid: 1ml/kcal or per MD


 Nutrition Intervention


     Change Diet Order:                          Continue Consistent CHO/


                                                 Cardiac diet.


     Add Supplement/Snack (indicate name/kcal    Ensure Max Protein


      /protein )                                 


     Provides kCal:                              300


     Provides Protein (gm)                       60


     Goal #1                                     Pt meet >80% of kcal/PRO needs


                                                 via PO/ONS intakes.


     Anticipated Discharge Needs:                Cardiac diet


     Follow-Up By:                               10/24/19


     Additional Comments                         F/U for PO/ONS intakes.

## 2019-10-23 LAB
BUN SERPL-MCNC: 26 MG/DL (ref 9–20)
BUN/CREAT SERPL: 24 %
CALCIUM SERPL-MCNC: 8.6 MG/DL (ref 8.4–10.2)
HEMOLYSIS INDEX: 11

## 2019-10-23 RX ADMIN — PROPRANOLOL HYDROCHLORIDE SCH MG: 10 TABLET ORAL at 05:27

## 2019-10-23 RX ADMIN — METHIMAZOLE SCH MG: 5 TABLET ORAL at 05:27

## 2019-10-23 RX ADMIN — METHIMAZOLE SCH MG: 5 TABLET ORAL at 13:04

## 2019-10-23 RX ADMIN — Medication SCH ML: at 01:57

## 2019-10-23 RX ADMIN — ACETAMINOPHEN SCH MG: 500 TABLET ORAL at 23:25

## 2019-10-23 RX ADMIN — PROPRANOLOL HYDROCHLORIDE SCH MG: 10 TABLET ORAL at 23:21

## 2019-10-23 RX ADMIN — Medication SCH ML: at 09:24

## 2019-10-23 RX ADMIN — APIXABAN SCH MG: 2.5 TABLET, FILM COATED ORAL at 23:21

## 2019-10-23 RX ADMIN — PANTOPRAZOLE SODIUM SCH MG: 40 TABLET, DELAYED RELEASE ORAL at 09:24

## 2019-10-23 RX ADMIN — Medication SCH ML: at 23:29

## 2019-10-23 RX ADMIN — APIXABAN SCH MG: 2.5 TABLET, FILM COATED ORAL at 09:24

## 2019-10-23 RX ADMIN — METOPROLOL SUCCINATE SCH MG: 50 TABLET, EXTENDED RELEASE ORAL at 16:32

## 2019-10-23 RX ADMIN — ACETAMINOPHEN SCH MG: 500 TABLET ORAL at 13:04

## 2019-10-23 RX ADMIN — ACETAMINOPHEN SCH: 500 TABLET ORAL at 18:45

## 2019-10-23 RX ADMIN — METHIMAZOLE SCH MG: 5 TABLET ORAL at 23:20

## 2019-10-23 RX ADMIN — ACETAMINOPHEN SCH MG: 500 TABLET ORAL at 05:27

## 2019-10-23 RX ADMIN — ACETAMINOPHEN SCH MG: 500 TABLET ORAL at 02:01

## 2019-10-23 NOTE — PROGRESS NOTE
Assessment and Plan


Assessment and plan: 





(1) Atrial fibrillation with RVR


Current Visit: Yes   Status: Acute   


Plan to address problem: 


Cardizem for rate control, cardiology consulted, therapeutic anticoagulation. 

remote telemetry. Pt cardioverted S/P loading dose of cardizem.  





Hyperthyroidism


- TSH <0.005 and free t4 2.45


- Patient started on methimazole


- We will do a nuclear medicine uptake scan








(2) CHF (congestive heart failure)


Current Visit: Yes   Status: Suspected   


Qualifiers: 


   Heart failure chronicity: acute 


Plan to address problem: 


Cardiology consulted, strict I/O, daily weight, monitor uop q shift, pulse 

oximetry, bnp, chest x ray, supportive care. 








(3) Severe malnutrition


Current Visit: Yes   Status: Acute   


Plan to address problem: 


Encourage increased protein intake, dietary supplementation








(4) COPD (chronic obstructive pulmonary disease)


Current Visit: Yes   Status: Acute   


Qualifiers: 


   Chronic bronchitis type: mixed simple and mucopurulent 


Plan to address problem: 


supplemental oxygen, nebulizer therapy, NIPPV as clinically indicated. 








(5) Non compliance w medication regimen


Current Visit: Yes   Status: Acute   


Plan to address problem: 


Behavior change counseling, +15 minutes, 








(6) DVT prophylaxis


Current Visit: Yes   Status: Acute   


Plan to address problem: 


SCD to BLE while in bed, therapeutic anticoagulation





History


Interval history: 





Patient was seen and evaluated this morning, patient didn't have any complaints.





Hospitalist Physical





- Physical exam


Narrative exam: 





 Not in cardiopulmonary distress. 


 The patient appeared well nourished and normally developed.


 Vital signs as documented.


 Head exam is unremarkable.


 No scleral icterus .


 Neck is without jugular venous distension, thyromegaly, or carotid bruits. 


 Lungs are clear to auscultation.


Cardiac exam reveals regular rate and  Rhythm.


Abdominal exam reveals normal bowel sounds. 


Extremities are nonedematous and both femoral and pedal pulses are normal.


CNS: Alert and oriented 3.  No focal weakness.





- Constitutional


Vitals: 


                                        











Temp Pulse Resp BP Pulse Ox


 


 97.6 F   99 H  18   127/77   98 


 


 10/23/19 07:47  10/23/19 07:47  10/23/19 07:47  10/23/19 07:47  10/23/19 07:47











General appearance: Present: no acute distress





Results





- Labs


CBC & Chem 7: 


                                 10/21/19 14:32





                                 10/23/19 06:01


Labs: 


                             Laboratory Last Values











WBC  5.0 K/mm3 (4.5-11.0)   10/21/19  14:32    


 


RBC  4.20 M/mm3 (3.65-5.03)   10/21/19  14:32    


 


Hgb  8.6 gm/dl (11.8-15.2)  L  10/21/19  14:32    


 


Hct  28.5 % (35.5-45.6)  L  10/21/19  14:32    


 


MCV  68 fl (84-94)  L  10/21/19  14:32    


 


MCH  21 pg (28-32)  L  10/21/19  14:32    


 


MCHC  30 % (32-34)  L  10/21/19  14:32    


 


RDW  21.9 % (13.2-15.2)  H  10/21/19  14:32    


 


Plt Count  149 K/mm3 (140-440)   10/21/19  14:32    


 


Lymph % (Auto)  23.5 % (13.4-35.0)   10/21/19  14:32    


 


Mono % (Auto)  14.1 % (0.0-7.3)  H  10/21/19  14:32    


 


Eos % (Auto)  0.6 % (0.0-4.3)   10/21/19  14:32    


 


Baso % (Auto)  0.3 % (0.0-1.8)   10/21/19  14:32    


 


Lymph #  1.2 K/mm3 (1.2-5.4)   10/21/19  14:32    


 


Mono #  0.7 K/mm3 (0.0-0.8)   10/21/19  14:32    


 


Eos #  0.0 K/mm3 (0.0-0.4)   10/21/19  14:32    


 


Baso #  0.0 K/mm3 (0.0-0.1)   10/21/19  14:32    


 


Seg Neutrophils %  61.5 % (40.0-70.0)   10/21/19  14:32    


 


Seg Neutrophils #  3.1 K/mm3 (1.8-7.7)   10/21/19  14:32    


 


PT  14.7 Sec. (12.2-14.9)   10/21/19  14:32    


 


INR  1.18  (0.87-1.13)  H  10/21/19  14:32    


 


APTT  37.5 Sec. (24.2-36.6)  H  10/21/19  14:32    


 


Sodium  140 mmol/L (137-145)   10/23/19  06:01    


 


Potassium  4.6 mmol/L (3.6-5.0)   10/23/19  06:01    


 


Chloride  105.1 mmol/L ()   10/23/19  06:01    


 


Carbon Dioxide  19 mmol/L (22-30)  L  10/23/19  06:01    


 


Anion Gap  21 mmol/L  10/23/19  06:01    


 


BUN  26 mg/dL (9-20)  H  10/23/19  06:01    


 


Creatinine  1.1 mg/dL (0.8-1.5)   10/23/19  06:01    


 


Estimated GFR  > 60 ml/min  10/23/19  06:01    


 


BUN/Creatinine Ratio  24 %  10/23/19  06:01    


 


Glucose  83 mg/dL ()   10/23/19  06:01    


 


POC Glucose  66  ()  L  10/22/19  16:46    


 


Lactic Acid  1.80 mmol/L (0.7-2.0)   10/21/19  16:16    


 


Calcium  8.6 mg/dL (8.4-10.2)   10/23/19  06:01    


 


Magnesium  2.20 mg/dL (1.7-2.3)   10/21/19  20:08    


 


Troponin T  < 0.010 ng/mL (0.00-0.029)   10/21/19  20:08    


 


NT-Pro-B Natriuret Pep  5264 pg/mL (0-900)  H  10/21/19  16:16    


 


TSH  < 0.005 mlU/mL (0.270-4.200)  L  10/21/19  16:16    


 


Free T4  1.90 ng/dL (0.76-1.46)  H  10/22/19  14:31    


 


Urine Color  Yellow  (Yellow)   10/21/19  16:30    


 


Urine Turbidity  Clear  (Clear)   10/21/19  16:30    


 


Urine pH  6.0  (5.0-7.0)   10/21/19  16:30    


 


Ur Specific Gravity  1.014  (1.003-1.030)   10/21/19  16:30    


 


Urine Protein  <15 mg/dl mg/dL (Negative)   10/21/19  16:30    


 


Urine Glucose (UA)  Neg mg/dL (Negative)   10/21/19  16:30    


 


Urine Ketones  Neg mg/dL (Negative)   10/21/19  16:30    


 


Urine Blood  Neg  (Negative)   10/21/19  16:30    


 


Urine Nitrite  Neg  (Negative)   10/21/19  16:30    


 


Urine Bilirubin  Neg  (Negative)   10/21/19  16:30    


 


Urine Urobilinogen  < 2.0 mg/dL (<2.0)   10/21/19  16:30    


 


Ur Leukocyte Esterase  Tr  (Negative)   10/21/19  16:30    


 


Urine WBC (Auto)  2.0 /HPF (0.0-6.0)   10/21/19  16:30    


 


Urine RBC (Auto)  1.0 /HPF (0.0-6.0)   10/21/19  16:30    


 


U Epithel Cells (Auto)  < 1.0 /HPF (0-13.0)   10/21/19  16:30    


 


Salicylates  < 0.3 mg/dL (2.8-20.0)  L  10/21/19  07:28    


 


Acetaminophen  < 5.0 ug/mL (10.0-30.0)  L  10/21/19  07:28    


 


Plasma/Serum Alcohol  < 0.01 % (0-0.07)   10/21/19  16:16    


 


Syphilis IgG Antibody  Non-reactive  (NonReactive)   10/22/19  15:00    














Active Medications





- Current Medications


Current Medications: 














Generic Name Dose Route Start Last Admin





  Trade Name Freq  PRN Reason Stop Dose Admin


 


Acetaminophen  650 mg  10/21/19 16:30 





  Tylenol  PO  





  Q4H PRN  





  Pain MILD(1-3)/Fever >100.5/HA  


 


Acetaminophen  500 mg  10/21/19 17:00  10/23/19 13:04





  Tylenol  PO   500 mg





  Q6H CLAUDIA   Administration


 


Apixaban  2.5 mg  10/22/19 12:00  10/23/19 09:24





  Eliquis  PO   2.5 mg





  Q12HR CLAUDIA   Administration





  Protocol  


 


Lisinopril  2.5 mg  10/24/19 10:00 





  Zestril  PO  





  QDAY CLAUDIA  


 


Methimazole  5 mg  10/22/19 17:00  10/23/19 13:04





  Tapazole  PO   5 mg





  Q8HR CALUDIA   Administration


 


Metoprolol Succinate  50 mg  10/23/19 13:00 





  Toprol Xl  PO  





  Q12H CLAUDIA  


 


Ondansetron HCl  4 mg  10/21/19 16:30  10/22/19 21:17





  Zofran  IV   4 mg





  Q8H PRN   Administration





  Nausea And Vomiting  


 


Pantoprazole Sodium  40 mg  10/22/19 10:00  10/23/19 09:24





  Protonix  PO   40 mg





  DAILY CLAUDIA   Administration


 


Sodium Chloride  10 ml  10/21/19 22:00  10/23/19 09:24





  Sodium Chloride Flush Syringe 10 Ml  IV   10 ml





  BID CLAUDIA   Administration


 


Sodium Chloride  10 ml  10/21/19 16:30 





  Sodium Chloride Flush Syringe 10 Ml  IV  





  PRN PRN  





  LINE FLUSH  














Nutrition/Malnutrition Assess





- Dietary Evaluation


Nutrition/Malnutrition Findings: 


                                        





Nutrition Notes                                            Start:  10/22/19 

10:06


Freq:                                                      Status: Active       




Protocol:                                                                       




 Document     10/22/19 10:06  AP  (Rec: 10/22/19 11:43  AP  [id number])


 Co-Sign      10/22/19 10:06  


 Nutrition Notes


     Need for Assessment generated from:         RN Referral


     Initial or Follow up                        Assessment


     Current Diagnosis                           COPD


     Other Pertinent Diagnosis                   Pneu, GERD, Anemia, CHF, A-Fib


     Current Diet                                Consistent CHO/Cardiac


     Labs/Tests                                  Reviewed


     Pertinent Medications                       Reviewed


     Height                                      6 ft 3 in


     Weight                                      48.3 kg


     Ideal Body Weight (kg)                      89.09


     BMI                                         13.3


     Intake Prior to Admission                   Poor


     Weight Status                               Emaciated


     Subjective/Other Information                Screen for MST. Pt states he


                                                 has a poor appetite. Pt


                                                 consumed 70% of bfast tray. Pt


                                                 states he has lost 40 pounds


                                                 in 3 months. Noted moderate


                                                 clavicle/temporal wasting.


     Burn                                        Absent


     Trauma                                      Absent


     Minimum of two criteria                     Yes


     Interpretation of Weight Loss (severe)      >7.5% in 3 months


     Body Fat Depletion                          Moderate depletion (severe)


     Muscle Mass                                 Moderate Depletion (severe)


     #1


      Nutrition Diagnosis                        Malnutrition


      Etiology                                   CHF


      As Evidenced by Signs and Symptoms         >7.5% weight loss in 3 months


                                                 Moderate muscle mass and body


                                                 fat depletion.


     Is patient on ventilator?                   No


     Is Patient Ambulatory and/or Out of Bed     Yes


     REE-([place]-ambulatory/OOB) [     1694.719


      [email or web address]]                               


     Kcal/Kg value to use for calculation        45


     Approximate Energy Requirements Using       2174


      kcal/Kg                                    


     Calculation Used for Recommendations        Kcal/kg


     Additional Notes                            PRO needs: 106-133g/day (1.2-1


                                                 .5g/kg/IBW 89 kg)


                                                 Fluid: 1ml/kcal or per MD


 Nutrition Intervention


     Change Diet Order:                          Continue Consistent CHO/


                                                 Cardiac diet.


     Add Supplement/Snack (indicate name/kcal    Ensure Max Protein


      /protein )                                 


     Provides kCal:                              300


     Provides Protein (gm)                       60


     Goal #1                                     Pt meet >80% of kcal/PRO needs


                                                 via PO/ONS intakes.


     Anticipated Discharge Needs:                Cardiac diet


     Follow-Up By:                               10/24/19


     Additional Comments                         F/U for PO/ONS intakes.

## 2019-10-23 NOTE — PROGRESS NOTE
Assessment and Plan





Atypical chest pain 


Atrial fibrillation, new onset


   likely due to hyperthyroidism


   initiated on low dose Eliquis


   on propranolol for optimal rate control


Severe hypothyroidism 


   TSH level less than 0.005.


Hx of ETOH abuse





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

15%.











Subjective


Date of service: 10/23/19


Interval history: 





Patient is resting in bed. 


Atrial fibrillation with a well controlled ventricular rate on telemetry.





Objective


                                   Vital Signs











  Temp Pulse Resp BP Pulse Ox


 


 10/23/19 07:47  97.6 F  99 H  18  127/77  98


 


 10/23/19 03:03  97.6 F  74  18  119/65  94


 


 10/22/19 20:02  97.4 F L  70  18  116/74  97


 


 10/22/19 14:05      94


 


 10/22/19 13:08  97.7 F  97 H  20  125/83  93














- Physical Examination


General: No Apparent Distress, Other (frail)


HEENT: Positive: PERRL


Neck: Positive: trachea midline


Cardiac: Positive: irregularly irregular


Lungs: Positive: Decreased Breath Sounds


Neuro: Positive: Grossly Intact


Extremities: Absent: edema





- Labs and Meds


                          Comprehensive Metabolic Panel











  10/23/19 Range/Units





  06:01 


 


Sodium  140  (137-145)  mmol/L


 


Potassium  4.6  (3.6-5.0)  mmol/L


 


Chloride  105.1  ()  mmol/L


 


Carbon Dioxide  19 L  (22-30)  mmol/L


 


BUN  26 H  (9-20)  mg/dL


 


Creatinine  1.1  (0.8-1.5)  mg/dL


 


Glucose  83  ()  mg/dL


 


Calcium  8.6  (8.4-10.2)  mg/dL

## 2019-10-24 RX ADMIN — APIXABAN SCH MG: 2.5 TABLET, FILM COATED ORAL at 10:55

## 2019-10-24 RX ADMIN — ACETAMINOPHEN SCH MG: 500 TABLET ORAL at 17:21

## 2019-10-24 RX ADMIN — LISINOPRIL SCH MG: 5 TABLET ORAL at 10:55

## 2019-10-24 RX ADMIN — METOPROLOL SUCCINATE SCH MG: 50 TABLET, EXTENDED RELEASE ORAL at 16:02

## 2019-10-24 RX ADMIN — ACETAMINOPHEN SCH MG: 500 TABLET ORAL at 10:55

## 2019-10-24 RX ADMIN — Medication SCH ML: at 11:02

## 2019-10-24 RX ADMIN — METHIMAZOLE SCH: 5 TABLET ORAL at 19:03

## 2019-10-24 RX ADMIN — ACETAMINOPHEN SCH MG: 500 TABLET ORAL at 23:22

## 2019-10-24 RX ADMIN — APIXABAN SCH MG: 2.5 TABLET, FILM COATED ORAL at 21:20

## 2019-10-24 RX ADMIN — ACETAMINOPHEN SCH MG: 500 TABLET ORAL at 08:07

## 2019-10-24 RX ADMIN — PANTOPRAZOLE SODIUM SCH MG: 40 TABLET, DELAYED RELEASE ORAL at 10:56

## 2019-10-24 RX ADMIN — METOPROLOL SUCCINATE SCH MG: 50 TABLET, EXTENDED RELEASE ORAL at 08:07

## 2019-10-24 RX ADMIN — Medication SCH ML: at 21:21

## 2019-10-24 NOTE — NUCLEAR MEDICINE REPORT
NM thyroid uptake multiple



INDICATION:

hyperthyroidism.



TECHNIQUE: 

385 uCi of I-123 is administered orally. 5 and 25 hour uptake is performed.



COMPARISON: 

No relevant prior imaging study available.



FINDINGS:

The 5 hour uptake is 4.3% which is at the lower end of the normal range. 25 hour uptake 2.9% which is
 significantly below normal.



There is decreased uptake in the left lobe compared to the right this could be due to nodules but I d
o not see a definite discrete nodule on this study. No hot nodules are seen.



IMPRESSION:

1. 25 hour uptake is significantly below normal. The 4 hour uptake is at the lower end of the normal 
range.



There is mild photopenia in the left lobe compared to the right could be related to nodules but I do 
not see definite discrete nodule on the study. Thyroid ultrasound suggested to further evaluate..



Signer Name: Hiren Yan MD 

Signed: 10/24/2019 9:53 AM

 Workstation Name: [id number]

## 2019-10-24 NOTE — PROGRESS NOTE
Assessment and Plan





Atypical chest pain 


Atrial fibrillation, new onset


   pt has since reverted to sinus rhythm


   likely due to hyperthyroidism


   initiated on low dose Eliquis and on Toprol XL for suppression


Severe hyperthyroidism 


   TSH level less than 0.005.


Hx of ETOH abuse





Echocardiogram reports a 4 chamber dilated cardiomyopathy, ejection fraction 

10-15%.





We will repeat an ECG.


Continue medical therapy for paroxysmal atrial fibrillation.





Subjective


Date of service: 10/24/19


Interval history: 





Patient has reverted to sinus rhythm on telemetry. 


He has no cardiac complaints. 





Objective


                                   Vital Signs











  Temp Pulse Resp BP Pulse Ox


 


 10/24/19 08:06  98.3 F  76  20  137/86  97


 


 10/24/19 01:50  98.6 F  54 L  18  119/63  96


 


 10/23/19 18:59  97.4 F L  79  18  128/66  94














- Physical Examination


General: No Apparent Distress, Other (frail)


HEENT: Positive: PERRL


Neck: Positive: trachea midline


Cardiac: Positive: Reg Rate and Rhythm


Lungs: Positive: Decreased Breath Sounds


Neuro: Positive: Grossly Intact


Extremities: Absent: edema

## 2019-10-25 VITALS — SYSTOLIC BLOOD PRESSURE: 112 MMHG | DIASTOLIC BLOOD PRESSURE: 46 MMHG

## 2019-10-25 RX ADMIN — ACETAMINOPHEN SCH MG: 500 TABLET ORAL at 05:32

## 2019-10-25 RX ADMIN — ACETAMINOPHEN SCH MG: 500 TABLET ORAL at 11:21

## 2019-10-25 RX ADMIN — PANTOPRAZOLE SODIUM SCH MG: 40 TABLET, DELAYED RELEASE ORAL at 09:19

## 2019-10-25 RX ADMIN — APIXABAN SCH MG: 2.5 TABLET, FILM COATED ORAL at 09:19

## 2019-10-25 RX ADMIN — LISINOPRIL SCH: 5 TABLET ORAL at 09:22

## 2019-10-25 RX ADMIN — Medication SCH ML: at 09:19

## 2019-10-25 RX ADMIN — METOPROLOL SUCCINATE SCH MG: 50 TABLET, EXTENDED RELEASE ORAL at 02:00

## 2019-10-25 NOTE — PROGRESS NOTE
Assessment and Plan





Atypical chest pain 


Atrial fibrillation, new onset


   pt has since reverted to sinus rhythm


   likely due to hyperthyroidism


   initiated on low dose Eliquis and on Toprol XL for suppression


Severe hyperthyroidism 


   TSH level less than 0.005.


Hx of ETOH abuse





Echocardiogram reports a 4 chamber dilated cardiomyopathy, ejection fraction 

10-15%.





Continue medical therapy for paroxysmal atrial fibrillation and dilated 

cardiomyopathy.


Either predischarge or in the outpatient setting, we will consider noninvasive 

ischemic cardiac testing.  





Subjective


Date of service: 10/25/19


Interval history: 





Patient is resting in bed comfortably. No cardiac complaints.


Stable sinus rhythm on telemetry. 





Objective


                                   Vital Signs











  Temp Pulse Resp BP Pulse Ox


 


 10/25/19 09:22   84   112/46 


 


 10/25/19 08:34      94


 


 10/25/19 02:05  98.1 F  61  18  117/55  100


 


 10/25/19 02:00   61   117/55 


 


 10/24/19 20:57      98


 


 10/24/19 20:18  97.5 F L  82  18  127/80  92


 


 10/24/19 19:48    18  


 


 10/24/19 19:40   69   


 


 10/24/19 14:41  97.6 F  69  20  121/85  100














- Physical Examination


General: No Apparent Distress, Other (frail)


HEENT: Positive: PERRL


Neck: Positive: trachea midline


Cardiac: Positive: Reg Rate and Rhythm


Lungs: Positive: Decreased Breath Sounds


Neuro: Positive: Grossly Intact


Extremities: Absent: edema

## 2019-10-25 NOTE — PROGRESS NOTE
History


Interval history: 





Patient was seen and evaluated this morning, patient has dizziness when he is 

walking. Patient is on IN oxygen.





Hospitalist Physical





- Constitutional


Vitals: 


                                        











Temp Pulse Resp BP Pulse Ox


 


 98.1 F   84   18   112/46   94 


 


 10/25/19 02:05  10/25/19 09:22  10/25/19 02:05  10/25/19 09:22  10/25/19 08:34











General appearance: Present: no acute distress





Results





- Labs


CBC & Chem 7: 


                                 10/21/19 14:32





                                 10/23/19 06:01


Labs: 


                             Laboratory Last Values











WBC  5.0 K/mm3 (4.5-11.0)   10/21/19  14:32    


 


RBC  4.20 M/mm3 (3.65-5.03)   10/21/19  14:32    


 


Hgb  8.6 gm/dl (11.8-15.2)  L  10/21/19  14:32    


 


Hct  28.5 % (35.5-45.6)  L  10/21/19  14:32    


 


MCV  68 fl (84-94)  L  10/21/19  14:32    


 


MCH  21 pg (28-32)  L  10/21/19  14:32    


 


MCHC  30 % (32-34)  L  10/21/19  14:32    


 


RDW  21.9 % (13.2-15.2)  H  10/21/19  14:32    


 


Plt Count  149 K/mm3 (140-440)   10/21/19  14:32    


 


Lymph % (Auto)  23.5 % (13.4-35.0)   10/21/19  14:32    


 


Mono % (Auto)  14.1 % (0.0-7.3)  H  10/21/19  14:32    


 


Eos % (Auto)  0.6 % (0.0-4.3)   10/21/19  14:32    


 


Baso % (Auto)  0.3 % (0.0-1.8)   10/21/19  14:32    


 


Lymph #  1.2 K/mm3 (1.2-5.4)   10/21/19  14:32    


 


Mono #  0.7 K/mm3 (0.0-0.8)   10/21/19  14:32    


 


Eos #  0.0 K/mm3 (0.0-0.4)   10/21/19  14:32    


 


Baso #  0.0 K/mm3 (0.0-0.1)   10/21/19  14:32    


 


Seg Neutrophils %  61.5 % (40.0-70.0)   10/21/19  14:32    


 


Seg Neutrophils #  3.1 K/mm3 (1.8-7.7)   10/21/19  14:32    


 


PT  14.7 Sec. (12.2-14.9)   10/21/19  14:32    


 


INR  1.18  (0.87-1.13)  H  10/21/19  14:32    


 


APTT  37.5 Sec. (24.2-36.6)  H  10/21/19  14:32    


 


Sodium  140 mmol/L (137-145)   10/23/19  06:01    


 


Potassium  4.6 mmol/L (3.6-5.0)   10/23/19  06:01    


 


Chloride  105.1 mmol/L ()   10/23/19  06:01    


 


Carbon Dioxide  19 mmol/L (22-30)  L  10/23/19  06:01    


 


Anion Gap  21 mmol/L  10/23/19  06:01    


 


BUN  26 mg/dL (9-20)  H  10/23/19  06:01    


 


Creatinine  1.1 mg/dL (0.8-1.5)   10/23/19  06:01    


 


Estimated GFR  > 60 ml/min  10/23/19  06:01    


 


BUN/Creatinine Ratio  24 %  10/23/19  06:01    


 


Glucose  83 mg/dL ()   10/23/19  06:01    


 


POC Glucose  66  ()  L  10/22/19  16:46    


 


Lactic Acid  1.80 mmol/L (0.7-2.0)   10/21/19  16:16    


 


Calcium  8.6 mg/dL (8.4-10.2)   10/23/19  06:01    


 


Magnesium  2.20 mg/dL (1.7-2.3)   10/21/19  20:08    


 


Troponin T  < 0.010 ng/mL (0.00-0.029)   10/21/19  20:08    


 


NT-Pro-B Natriuret Pep  5264 pg/mL (0-900)  H  10/21/19  16:16    


 


TSH  < 0.005 mlU/mL (0.270-4.200)  L  10/21/19  16:16    


 


Free T4  1.90 ng/dL (0.76-1.46)  H  10/22/19  14:31    


 


Urine Color  Yellow  (Yellow)   10/21/19  16:30    


 


Urine Turbidity  Clear  (Clear)   10/21/19  16:30    


 


Urine pH  6.0  (5.0-7.0)   10/21/19  16:30    


 


Ur Specific Gravity  1.014  (1.003-1.030)   10/21/19  16:30    


 


Urine Protein  <15 mg/dl mg/dL (Negative)   10/21/19  16:30    


 


Urine Glucose (UA)  Neg mg/dL (Negative)   10/21/19  16:30    


 


Urine Ketones  Neg mg/dL (Negative)   10/21/19  16:30    


 


Urine Blood  Neg  (Negative)   10/21/19  16:30    


 


Urine Nitrite  Neg  (Negative)   10/21/19  16:30    


 


Urine Bilirubin  Neg  (Negative)   10/21/19  16:30    


 


Urine Urobilinogen  < 2.0 mg/dL (<2.0)   10/21/19  16:30    


 


Ur Leukocyte Esterase  Tr  (Negative)   10/21/19  16:30    


 


Urine WBC (Auto)  2.0 /HPF (0.0-6.0)   10/21/19  16:30    


 


Urine RBC (Auto)  1.0 /HPF (0.0-6.0)   10/21/19  16:30    


 


U Epithel Cells (Auto)  < 1.0 /HPF (0-13.0)   10/21/19  16:30    


 


Salicylates  < 0.3 mg/dL (2.8-20.0)  L  10/21/19  07:28    


 


Acetaminophen  < 5.0 ug/mL (10.0-30.0)  L  10/21/19  07:28    


 


Plasma/Serum Alcohol  < 0.01 % (0-0.07)   10/21/19  16:16    


 


Syphilis IgG Antibody  Non-reactive  (NonReactive)   10/22/19  15:00    














Active Medications





- Current Medications


Current Medications: 














Generic Name Dose Route Start Last Admin





  Trade Name Freq  PRN Reason Stop Dose Admin


 


Acetaminophen  500 mg  10/21/19 17:00  10/25/19 05:32





  Tylenol  PO   500 mg





  Q6H CLAUDIA   Administration


 


Apixaban  2.5 mg  10/22/19 12:00  10/25/19 09:19





  Eliquis  PO   2.5 mg





  Q12HR CLAUDIA   Administration





  Protocol  


 


Lisinopril  2.5 mg  10/24/19 10:00  10/25/19 09:22





  Zestril  PO   Not Given





  QDAY CLAUDIA  


 


Metoprolol Succinate  50 mg  10/23/19 13:00  10/25/19 02:00





  Toprol Xl  PO   50 mg





  Q12H CLAUDIA   Administration


 


Ondansetron HCl  4 mg  10/21/19 16:30  10/22/19 21:17





  Zofran  IV   4 mg





  Q8H PRN   Administration





  Nausea And Vomiting  


 


Pantoprazole Sodium  40 mg  10/22/19 10:00  10/25/19 09:19





  Protonix  PO   40 mg





  DAILY CLAUDIA   Administration


 


Sodium Chloride  10 ml  10/21/19 22:00  10/25/19 09:19





  Sodium Chloride Flush Syringe 10 Ml  IV   10 ml





  BID CLAUDIA   Administration


 


Sodium Chloride  10 ml  10/21/19 16:30 





  Sodium Chloride Flush Syringe 10 Ml  IV  





  PRN PRN  





  LINE FLUSH  














Nutrition/Malnutrition Assess





- Dietary Evaluation


Nutrition/Malnutrition Findings: 


                                        





Nutrition Notes                                            Start:  10/22/19 

10:06


Freq:                                                      Status: Active       




Protocol:                                                                       




 Document     10/25/19 09:57  AP  (Rec: 10/25/19 10:18  AP  [id number])


 Co-Sign      10/25/19 09:57  LP


 Nutrition Notes


     Need for Assessment generated from:         RN Referral


     Initial or Follow up                        Brief Note


     Current Diagnosis                           COPD


     Other Pertinent Diagnosis                   Pneu, GERD, Anemia, CHF, A-Fib


     Current Diet                                Consistent CHO/Cardiac


     Subjective/Other Information                F/U for PO/ONS intakes. Pt


                                                 consumed 80% of bfast tray,


                                                 and was drinking Ensure.


     Percent of energy/protein needs met:        85%/72%


     #1


      Nutrition Diagnosis                        Malnutrition


      Diagnosis Progress(for reassessment        Continues


       documentation)                            


 Nutrition Intervention


     Change Diet Order:                          Continue Consistent CHO/


                                                 Cardiac diet.


     Add Supplement/Snack (indicate name/kcal    Ensure Enlive Daily


      /protein )                                 


     Provides kCal:                              350


     Provides Protein (gm)                       20


     Follow-Up By:                               10/29/19


     Additional Comments                         F/U for stable PO/ONS intakes.

## 2019-10-25 NOTE — ULTRASOUND REPORT
ULTRASOUND THYROID



INDICATION / CLINICAL INFORMATION:

suspected nodule on NM scan.



COMPARISON: 

Nuclear medicine thyroid uptake scan dated 10/23/2013



FINDINGS:



RIGHT LOBE: Size = 3.4 x 1.6 x 0.9 cm. 

- Echogenicity: Heterogeneous

- Vascularity: Normal.

- Nodules < 1 cm: None.

- Nodules >= 1 cm or Suspicious Nodules: None.



LEFT LOBE: Size = 3.4 x 1.5 x 1.0 cm. 

- Echogenicity: Heterogeneous

- Vascularity: Normal.

- Nodules < 1 cm: 2 subcentimeter nodules are identified. An 8 mm isodense nodule is suggested in the
 mid left thyroid lobe. A 4 mm hypoechoic nodule is identified near the inferior pole.

- Nodules >= 1 cm or Suspicious Nodules: None.



ISTHMUS: No significant abnormality. Thickness = 0.3 cm. 

- Nodules < 1 cm: None.

- Nodules >= 1 cm or Suspicious Nodules: None.



LYMPH NODES: No abnormal lymph nodes.



PARATHYROID GLANDS: No abnormal parathyroid gland.



ADDITIONAL FINDINGS: None.



IMPRESSION:

 Normal sized thyroid gland. The thyroid gland is slightly heterogeneous. 2 benign-appearing nodules 
are identified in the mid and lower left thyroid lobe.







Note: Nodule size based on mean (average) size of 3 dimensions.



Note: Nodules < 1 cm do not typically require follow-up or FNA unless there are suspicious features (
KYUNG, 2015)



------------------------------------------------------------------------

ACR TI-RADS Thyroid Nodule Recommendations

------------------------------------------------------------------------

TI-RADS 1 (0 points) -- Benign. No FNA or follow-up.

TI-RADS 2 (1-2 points) -- Not suspicious. No FNA or follow-up.

TI-RADS 3 (3 points) -- Mildly suspicious. Follow up in 1 year if  1.5 cm. FNA if  2.5 cm. 

TI-RADS 4 (4-6 points) -- Moderately suspicious. Follow up in 1 year if  1.0 cm. FNA if  1.5 cm. 

TI-RADS 5 (7+ points) -- Highly suspicious. Follow up in 1 year if  0.5 cm. FNA if  1.0 cm. 





Signer Name: Fermin Giraldo Jr, MD 

Signed: 10/25/2019 9:28 AM

 Workstation Name: [id number]

## 2019-10-25 NOTE — DISCHARGE SUMMARY
Providers





- Providers


Date of Admission: 


10/25/19 10:07





Date of discharge: 10/25/19


Attending physician: 


CHELA MOFFETT MD





                                        





10/21/19 20:01


Consult to Cardiology [CONS] Routine 


   Consulting Provider: ROSELYN HOPPER


   Reason For Exam: CHF/Atrial Fib





10/22/19 09:33


Physical Therapy Evaluation and Treat [CONS] Routine 


   Comment: 


   Reason For Exam: Weakness














Hospitalization


Reason for admission: Acute systolic CHF, hyperthyroidism, A.fib with RVR


Condition: Stable


Pertinent studies: 





Stress test


ECHO


Thyroid NM uptake scan


Thyroid US


Hospital course: 





     75 YO Male with Severe Malnutrition, GERD, OA, Nicotine Dependence, PUD, 

Anemia, Migraine HA, Medication Noncompliance presents to ED for evaluation. Pt 

states that he has experienced chest palpitations, and shortness of breath over 

the past 3 days with persistent symptoms over the same time frame. Pt 

transported to [facility] via private vehicle. Pt seen and evaluated in ED and found to

 have New onset Atrial Fib with RVR, as well as symptoms consistent with CHF. Pt

 noncompliant with outpatient medication. Pt initiated on therapeutic 

anticoagulation. CHADS 2 VAsc Score:2. Cardiology consulted in ED. Pt denies 

fever, chills, CP, NVD, Trauma, BRBPR, Productive cough, skin rash, prolonged 

travel/immobility, unilateral leg swelling, calf pain, Individual/Family history

 of DVT/PE/Bleeding/Blood Clotting Disorders. Pt is unsure of his outpatient 

medications and has not taken his medication for the past 3 months. PT admitted 

to [facility] unit and placed on telemetry monitoring.


   Patient was continue with BB and anticoagulation rate was controlled and it 

converted to sinus rhythm. Patient had severe hyperthyroidisma nd NM uptake scan

 was done which was normal, thyroid U/S showed normal, patient was initially 

started with methimazole and later I stopped it after all the testing, 

hyperthyroidism could be due to thyroiditis, which is usually transient . I have

 a long discussion with the patient and his daughter for the meed for immediate 

evaluation by endocrinologist, which his daughter started to endocrinologist 

office while I was there. Patient had Stress test and echo which showed severe 

cardiomyopathy with EF of 10-15%. Cardiology recommend guideline recommended 

medications and to follow in the office for F/U. Patient was doing well, 

evaluated by PT, discharged with home health. Appropriate home medication 

scripts were given at the time of discharge.  


Disposition: [id number] HOME UNDER HOME [facility]


Time spent for discharge: 32 minutes





- Discharge Diagnoses


(1) Hyperthyroidism


Status: Acute   





(2) Atrial fibrillation with RVR


Status: Acute   





(3) CHF (congestive heart failure)


Status: Acute   


Qualifiers: 


   Heart failure type: systolic   Heart failure chronicity: acute   Qualified 

Code(s): I50.21 - Acute systolic (congestive) heart failure   





(4) Cachexia


Status: Acute   





Core Measure Documentation





- Palliative Care


Palliative Care/ Comfort Measures: Not Applicable





- Core Measures


Any of the following diagnoses?: heart failure





- Heart Failure Discharge Requirements


ACE/ARB for LVSD if EF <40%: Yes


Beta blocker at discharge: Yes





Exam





- Physical Exam


Narrative exam: 





 Not in cardiopulmonary distress. 


 The patient is cachectic.


 Vital signs as documented.


 Head exam is unremarkable.


 No scleral icterus .


 Neck is without jugular venous distension, thyromegaly, or carotid bruits. 


 Lungs are clear to auscultation.


Cardiac exam reveals regular rate and  Rhythm.


Abdominal exam reveals normal bowel sounds. 


Extremities are nonedematous and both femoral and pedal pulses are normal.


CNS: Alert and oriented 3.  No focal weakness.





- Constitutional


Vitals: 


                                        











Temp Pulse Resp BP Pulse Ox


 


 98.1 F   84   18   112/46   94 


 


 10/25/19 02:05  10/25/19 09:22  10/25/19 02:05  10/25/19 09:22  10/25/19 08:34














Plan


Activity: advance as tolerated


Weight Bearing Status: Weight Bear as Tolerated


Diet: low cholesterol, low salt


Follow up with: 


JOSE DAVID TO [Other] - 3-5 Days (Patient need Endocrinologist referal for hyper

thyroidism)


Prescriptions: 


Apixaban [Eliquis] 2.5 mg PO Q12HR #60 tablet


Metoprolol Xl [Metoprolol SUCCINATE ER TAB] 50 mg PO Q12H #60 tablet


Esomeprazole Magnesium [NexIUM] 40 mg PO QDAY #30


Lisinopril [Zestril TAB] 2.5 mg PO QDAY #30 tablet

## 2020-09-24 ENCOUNTER — LAB OUTSIDE AN ENCOUNTER (OUTPATIENT)
Dept: URBAN - METROPOLITAN AREA CLINIC 109 | Facility: CLINIC | Age: 75
End: 2020-09-24

## 2020-09-24 ENCOUNTER — OFFICE VISIT (OUTPATIENT)
Dept: URBAN - METROPOLITAN AREA CLINIC 109 | Facility: CLINIC | Age: 75
End: 2020-09-24
Payer: MEDICARE

## 2020-09-24 DIAGNOSIS — K22.2 ESOPHAGEAL STENOSIS: ICD-10-CM

## 2020-09-24 DIAGNOSIS — R13.19 OTHER DYSPHAGIA: ICD-10-CM

## 2020-09-24 DIAGNOSIS — K94.23 GASTROSTOMY MALFUNCTION: ICD-10-CM

## 2020-09-24 PROBLEM — 230690007 STROKE: Status: ACTIVE | Noted: 2020-09-24

## 2020-09-24 PROBLEM — 85942002 ULCERATION OF INTESTINE: Status: ACTIVE | Noted: 2020-09-24

## 2020-09-24 PROBLEM — 68496003 POLYP COLON: Status: ACTIVE | Noted: 2020-09-24

## 2020-09-24 PROBLEM — 64766004 ULCERATIVE COLITIS: Status: ACTIVE | Noted: 2020-09-24

## 2020-09-24 PROBLEM — 22298006 MYOCARDIAL INFARCT: Status: ACTIVE | Noted: 2020-09-24

## 2020-09-24 PROBLEM — 38341003 HYPERTENSION: Status: ACTIVE | Noted: 2020-09-24

## 2020-09-24 PROBLEM — 271737000 ANEMIA: Status: ACTIVE | Noted: 2020-09-24

## 2020-09-24 PROBLEM — 73211009 DM - DIABETES MELLITUS: Status: ACTIVE | Noted: 2020-09-24

## 2020-09-24 PROBLEM — 235595009 GASTROESOPHAGEAL REFLUX DISEASE: Status: ACTIVE | Noted: 2020-09-24

## 2020-09-24 PROBLEM — 40930008 HYPOTHYROIDISM: Status: ACTIVE | Noted: 2020-09-24

## 2020-09-24 PROBLEM — 48694002 ANXIETY: Status: ACTIVE | Noted: 2020-09-24

## 2020-09-24 PROCEDURE — 99204 OFFICE O/P NEW MOD 45 MIN: CPT | Performed by: INTERNAL MEDICINE

## 2020-09-24 RX ORDER — ATORVASTATIN CALCIUM 80 MG/1
1 TABLET TABLET, FILM COATED ORAL ONCE A DAY
Status: ACTIVE | COMMUNITY

## 2020-09-24 RX ORDER — VITAMIN A 2400 MCG
1 TABLET CAPSULE ORAL ONCE A DAY
Status: ACTIVE | COMMUNITY

## 2020-09-24 RX ORDER — APIXABAN 5 MG/1
AS DIRECTED TABLET, FILM COATED ORAL
Status: ACTIVE | COMMUNITY

## 2020-09-24 RX ORDER — METOPROLOL SUCCINATE 50 MG/1
1 TABLET TABLET, FILM COATED, EXTENDED RELEASE ORAL ONCE A DAY
Status: ACTIVE | COMMUNITY

## 2020-09-24 RX ORDER — CYPROHEPTADINE HYDROCHLORIDE 4 MG/1
1 TABLET TABLET ORAL TWICE A DAY
Status: ACTIVE | COMMUNITY

## 2020-09-24 RX ORDER — OMEPRAZOLE 40 MG/1
1 CAPSULE 30 MINUTES BEFORE MORNING MEAL CAPSULE, DELAYED RELEASE ORAL ONCE A DAY
Status: ACTIVE | COMMUNITY

## 2020-09-24 RX ORDER — LISINOPRIL 5 MG/1
1 TABLET TABLET ORAL ONCE A DAY
Status: ACTIVE | COMMUNITY

## 2020-09-24 RX ORDER — METHIMAZOLE 5 MG/1
1 TABLET TABLET ORAL ONCE A DAY
Status: ACTIVE | COMMUNITY

## 2020-09-24 RX ORDER — AMLODIPINE BESYLATE 5 MG/1
1 TABLET TABLET ORAL ONCE A DAY
Status: ACTIVE | COMMUNITY

## 2020-09-24 NOTE — HPI-TODAY'S VISIT:
The patient was referred for evaluation of a gastrostomy site infection.  He was evaluated by his PCP and started on Keflex about 10 days ago.

## 2020-09-24 NOTE — PHYSICAL EXAM GASTROINTESTINAL
Abdomen , soft, nontender, nondistended , no guarding or rigidity , no masses palpable , normal bowel sounds , Liver and Spleen , no hepatomegaly present , no hepatosplenomegaly , liver nontender , spleen not palpable, G tube in LUQ.  The tube is a bit worn.  The peristomal skin is healthy in appearance

## 2020-09-24 NOTE — HPI-TODAY'S VISIT:
The patient has been referred for gastrostomy tube issues.  He had a gastrostomy tube placed in March of this year after a stroke.  He has a history of dysphagia to solid foods and in 2019 had a food impaction.  Endoscopy at that time revealed a proximal esophageal stricture.  The stricture was not dilated although he reports he has been dilated in the past.  Also of note is that the patient has a gastrojejunostomy from prior ulcer surgery.  Most the history was obtained from the patient. He was felt to have a jose-gastrostomy infection recently and placed on Keflex.  He was also given a local cream and this appears to be resolving the infection. He is taking approximately 1000 mL of tube feeds per day and another liter of water.  He does tolerate soft foods and thin fluids well although he is not certain his intake is normal yet. The patient has been on Eliquis for over a blood clot although he does not know where.

## 2021-02-04 ENCOUNTER — OFFICE VISIT (OUTPATIENT)
Dept: URBAN - METROPOLITAN AREA CLINIC 109 | Facility: CLINIC | Age: 76
End: 2021-02-04

## 2021-02-08 ENCOUNTER — CLAIMS CREATED FROM THE CLAIM WINDOW (OUTPATIENT)
Dept: URBAN - METROPOLITAN AREA CLINIC 109 | Facility: CLINIC | Age: 76
End: 2021-02-08
Payer: MEDICARE

## 2021-02-08 ENCOUNTER — WEB ENCOUNTER (OUTPATIENT)
Dept: URBAN - METROPOLITAN AREA CLINIC 109 | Facility: CLINIC | Age: 76
End: 2021-02-08

## 2021-02-08 ENCOUNTER — LAB OUTSIDE AN ENCOUNTER (OUTPATIENT)
Dept: URBAN - METROPOLITAN AREA CLINIC 109 | Facility: CLINIC | Age: 76
End: 2021-02-08

## 2021-02-08 DIAGNOSIS — Z86.73 HISTORY OF CVA (CEREBROVASCULAR ACCIDENT): ICD-10-CM

## 2021-02-08 DIAGNOSIS — K94.23 MALFUNCTION OF GASTROSTOMY TUBE: ICD-10-CM

## 2021-02-08 DIAGNOSIS — Z85.028 HISTORY OF GASTRIC CANCER: ICD-10-CM

## 2021-02-08 DIAGNOSIS — K92.1 MELENA: ICD-10-CM

## 2021-02-08 DIAGNOSIS — Z86.79 HISTORY OF CHF (CONGESTIVE HEART FAILURE): ICD-10-CM

## 2021-02-08 DIAGNOSIS — I25.2 HISTORY OF MI (MYOCARDIAL INFARCTION): ICD-10-CM

## 2021-02-08 PROBLEM — 473061005 HISTORY OF MALIGNANT NEOPLASM OF STOMACH: Status: ACTIVE | Noted: 2021-02-08

## 2021-02-08 PROBLEM — 1755008 OLD MYOCARDIAL INFARCTION: Status: ACTIVE | Noted: 2021-02-08

## 2021-02-08 PROCEDURE — G9902 PT SCRN TBCO AND ID AS USER: HCPCS | Performed by: INTERNAL MEDICINE

## 2021-02-08 PROCEDURE — G8482 FLU IMMUNIZE ORDER/ADMIN: HCPCS | Performed by: INTERNAL MEDICINE

## 2021-02-08 PROCEDURE — 99214 OFFICE O/P EST MOD 30 MIN: CPT | Performed by: INTERNAL MEDICINE

## 2021-02-08 PROCEDURE — G8420 CALC BMI NORM PARAMETERS: HCPCS | Performed by: INTERNAL MEDICINE

## 2021-02-08 PROCEDURE — G8427 DOCREV CUR MEDS BY ELIG CLIN: HCPCS | Performed by: INTERNAL MEDICINE

## 2021-02-08 RX ORDER — METOPROLOL SUCCINATE 50 MG/1
1 TABLET TABLET, FILM COATED, EXTENDED RELEASE ORAL ONCE A DAY
Status: ACTIVE | COMMUNITY

## 2021-02-08 RX ORDER — VITAMIN A 2400 MCG
1 TABLET CAPSULE ORAL ONCE A DAY
Status: ACTIVE | COMMUNITY

## 2021-02-08 RX ORDER — OMEPRAZOLE 40 MG/1
1 CAPSULE 30 MINUTES BEFORE MORNING MEAL CAPSULE, DELAYED RELEASE ORAL ONCE A DAY
Status: ACTIVE | COMMUNITY

## 2021-02-08 RX ORDER — CYPROHEPTADINE HYDROCHLORIDE 4 MG/1
1 TABLET TABLET ORAL TWICE A DAY
Status: ACTIVE | COMMUNITY

## 2021-02-08 RX ORDER — LISINOPRIL 5 MG/1
1 TABLET TABLET ORAL ONCE A DAY
Status: ACTIVE | COMMUNITY

## 2021-02-08 RX ORDER — ATORVASTATIN CALCIUM 80 MG/1
1 TABLET TABLET, FILM COATED ORAL ONCE A DAY
Status: ACTIVE | COMMUNITY

## 2021-02-08 RX ORDER — APIXABAN 5 MG/1
AS DIRECTED TABLET, FILM COATED ORAL
Status: ACTIVE | COMMUNITY

## 2021-02-08 RX ORDER — METHIMAZOLE 5 MG/1
1 TABLET TABLET ORAL ONCE A DAY
Status: ACTIVE | COMMUNITY

## 2021-02-08 RX ORDER — AMLODIPINE BESYLATE 5 MG/1
1 TABLET TABLET ORAL ONCE A DAY
Status: ACTIVE | COMMUNITY

## 2021-02-08 NOTE — PHYSICAL EXAM GASTROINTESTINAL
Abdomen , soft, nontender, nondistended , no guarding or rigidity , no masses palpable , normal bowel sounds , Liver and Spleen , no hepatomegaly present , no hepatosplenomegaly , liver nontender , spleen not palpable. G tube in proximal LUQ.  The site is clean and dry with no apparent drainage or fluctuant areas.  The tube is somewhat worn, but not occluded.

## 2021-02-08 NOTE — HPI-TODAY'S VISIT:
Patient returns for evaluation of G tube which is leaking around the tube and a painful knot and redness.  Stools have been dark while on iront therapy.  The patient has a history of gastric cancer and partial gastrectomy in the 1990s.  A G tube was place in March of 2020 due to aspiration and difficulty swallowing.  It was probably placed by a surgeon at [facility]. He had a swallowing test and was told of aspiration on this basis.  It was done after the stroke and the patient has no gag reflex.  He coughs "all the time."  He tolerates soft food, but eats soft food.  He is not sure if he can eat enough to maintain his weight as his appetite was reduced.  He is on ciprohepatadine.  He has gained 50 lbs in less than a year with eating and 3 cans of tube feeds.  EGD in 2019 for food impaction revealed partial gastrectomy and a benign appearing stricture at the EG junction which was not dilated at the time.

## 2021-04-12 ENCOUNTER — LAB OUTSIDE AN ENCOUNTER (OUTPATIENT)
Dept: URBAN - METROPOLITAN AREA CLINIC 109 | Facility: CLINIC | Age: 76
End: 2021-04-12

## 2021-04-12 ENCOUNTER — OFFICE VISIT (OUTPATIENT)
Dept: URBAN - METROPOLITAN AREA CLINIC 109 | Facility: CLINIC | Age: 76
End: 2021-04-12
Payer: MEDICARE

## 2021-04-12 DIAGNOSIS — Z86.79 HISTORY OF CHF (CONGESTIVE HEART FAILURE): ICD-10-CM

## 2021-04-12 DIAGNOSIS — R10.13 EPIGASTRIC ABDOMINAL PAIN: ICD-10-CM

## 2021-04-12 DIAGNOSIS — R74.01 ELEVATED TRANSAMINASE LEVEL: ICD-10-CM

## 2021-04-12 DIAGNOSIS — Z90.3 HISTORY OF PARTIAL GASTRECTOMY: ICD-10-CM

## 2021-04-12 DIAGNOSIS — Z87.11 HISTORY OF PEPTIC ULCER DISEASE: ICD-10-CM

## 2021-04-12 DIAGNOSIS — K94.23 GASTROSTOMY MALFUNCTION: ICD-10-CM

## 2021-04-12 DIAGNOSIS — K92.1 MELENA: ICD-10-CM

## 2021-04-12 PROCEDURE — 99203 OFFICE O/P NEW LOW 30 MIN: CPT | Performed by: INTERNAL MEDICINE

## 2021-04-12 RX ORDER — VITAMIN A 2400 MCG
1 TABLET CAPSULE ORAL ONCE A DAY
Status: ACTIVE | COMMUNITY

## 2021-04-12 RX ORDER — APIXABAN 5 MG/1
AS DIRECTED TABLET, FILM COATED ORAL
Status: ACTIVE | COMMUNITY

## 2021-04-12 RX ORDER — METOPROLOL SUCCINATE 50 MG/1
1 TABLET TABLET, FILM COATED, EXTENDED RELEASE ORAL ONCE A DAY
Status: ACTIVE | COMMUNITY

## 2021-04-12 RX ORDER — LISINOPRIL 5 MG/1
1 TABLET TABLET ORAL ONCE A DAY
Status: ON HOLD | COMMUNITY

## 2021-04-12 RX ORDER — CYPROHEPTADINE HYDROCHLORIDE 4 MG/1
1 TABLET TABLET ORAL TWICE A DAY
Status: ACTIVE | COMMUNITY

## 2021-04-12 RX ORDER — ATORVASTATIN CALCIUM 80 MG/1
1 TABLET TABLET, FILM COATED ORAL ONCE A DAY
Status: ACTIVE | COMMUNITY

## 2021-04-12 RX ORDER — METHIMAZOLE 5 MG/1
1 TABLET TABLET ORAL ONCE A DAY
Status: ACTIVE | COMMUNITY

## 2021-04-12 RX ORDER — OMEPRAZOLE 40 MG/1
1 CAPSULE 30 MINUTES BEFORE MORNING MEAL CAPSULE, DELAYED RELEASE ORAL ONCE A DAY
Status: ACTIVE | COMMUNITY

## 2021-04-12 RX ORDER — AMLODIPINE BESYLATE 5 MG/1
1 TABLET TABLET ORAL ONCE A DAY
Status: ON HOLD | COMMUNITY

## 2021-04-12 NOTE — HPI-TODAY'S VISIT:
The patient returs for care for abdominal pain, a history of G tube malfunction and tube feed depenence.  He has a history melenic stools, but is taking iron.  Patient is s/p partial gastrectomy due to ulcer disease in the mid 1990s.   His intake had been improving but was not felt quite sufficient  on the last visit and patient was to have EGD and change of  tube at the hospital off [facility].  He has a history of CHF.  Today he reports pain around the G tube recently.  He has not used the tube for feedings in 2 months and the weight is preserved.  He thinks he is eating relatively well.

## 2021-04-12 NOTE — PHYSICAL EXAM GASTROINTESTINAL
Abdomen , soft, nontender, nondistended , no guarding or rigidity , no masses palpable , normal bowel sounds , Liver and Spleen , no hepatomegaly present , no hepatosplenomegaly , liver nontender , spleen not palpable.  Gastrostomy tube in LUQ, the tube is worn.  The jose gastrostomy site is normal.

## 2021-04-13 LAB
A/G RATIO: 1.4
ALBUMIN: 4.2
ALKALINE PHOSPHATASE: 103
ALT (SGPT): 29
AST (SGOT): 40
BILIRUBIN, TOTAL: 0.6
BUN/CREATININE RATIO: 8
BUN: 14
CALCIUM: 9.4
CARBON DIOXIDE, TOTAL: 22
CHLORIDE: 102
CREATININE: 1.7
EGFR IF AFRICN AM: 44
EGFR IF NONAFRICN AM: 38
GLOBULIN, TOTAL: 3.1
GLUCOSE: 77
HBSAG SCREEN: NEGATIVE
HEP B CORE AB, TOT: NEGATIVE
HEP C VIRUS AB: <0.1
POTASSIUM: 4.6
PROTEIN, TOTAL: 7.3
SODIUM: 142

## 2021-04-22 ENCOUNTER — LAB OUTSIDE AN ENCOUNTER (OUTPATIENT)
Dept: URBAN - METROPOLITAN AREA CLINIC 109 | Facility: CLINIC | Age: 76
End: 2021-04-22

## 2021-08-27 ENCOUNTER — TELEPHONE ENCOUNTER (OUTPATIENT)
Dept: URBAN - METROPOLITAN AREA CLINIC 40 | Facility: CLINIC | Age: 76
End: 2021-08-27

## 2021-08-31 ENCOUNTER — CLAIMS CREATED FROM THE CLAIM WINDOW (OUTPATIENT)
Dept: URBAN - METROPOLITAN AREA CLINIC 109 | Facility: CLINIC | Age: 76
End: 2021-08-31
Payer: MEDICARE

## 2021-08-31 DIAGNOSIS — K94.23 MALFUNCTION OF GASTROSTOMY TUBE: ICD-10-CM

## 2021-08-31 DIAGNOSIS — Z90.3 HISTORY OF PARTIAL GASTRECTOMY: ICD-10-CM

## 2021-08-31 DIAGNOSIS — K22.2 ESOPHAGEAL STENOSIS: ICD-10-CM

## 2021-08-31 DIAGNOSIS — Z86.79 HISTORY OF CHF (CONGESTIVE HEART FAILURE): ICD-10-CM

## 2021-08-31 DIAGNOSIS — Z87.11 HISTORY OF PEPTIC ULCER DISEASE: ICD-10-CM

## 2021-08-31 PROBLEM — 107701000119101: Status: ACTIVE | Noted: 2021-04-12

## 2021-08-31 PROCEDURE — 99213 OFFICE O/P EST LOW 20 MIN: CPT | Performed by: INTERNAL MEDICINE

## 2021-08-31 RX ORDER — AMLODIPINE BESYLATE 5 MG/1
1 TABLET TABLET ORAL ONCE A DAY
Status: ON HOLD | COMMUNITY

## 2021-08-31 RX ORDER — APIXABAN 5 MG/1
AS DIRECTED TABLET, FILM COATED ORAL
Status: ACTIVE | COMMUNITY

## 2021-08-31 RX ORDER — VITAMIN A 2400 MCG
1 TABLET CAPSULE ORAL ONCE A DAY
Status: ACTIVE | COMMUNITY

## 2021-08-31 RX ORDER — CYPROHEPTADINE HYDROCHLORIDE 4 MG/1
1 TABLET TABLET ORAL TWICE A DAY
Status: ACTIVE | COMMUNITY

## 2021-08-31 RX ORDER — LISINOPRIL 5 MG/1
1 TABLET TABLET ORAL ONCE A DAY
Status: ON HOLD | COMMUNITY

## 2021-08-31 RX ORDER — METOPROLOL SUCCINATE 50 MG/1
1 TABLET TABLET, FILM COATED, EXTENDED RELEASE ORAL ONCE A DAY
Status: ACTIVE | COMMUNITY

## 2021-08-31 RX ORDER — METHIMAZOLE 5 MG/1
1 TABLET TABLET ORAL ONCE A DAY
Status: ACTIVE | COMMUNITY

## 2021-08-31 RX ORDER — OMEPRAZOLE 40 MG/1
1 CAPSULE 30 MINUTES BEFORE MORNING MEAL CAPSULE, DELAYED RELEASE ORAL ONCE A DAY
Status: ACTIVE | COMMUNITY

## 2021-08-31 RX ORDER — ATORVASTATIN CALCIUM 80 MG/1
1 TABLET TABLET, FILM COATED ORAL ONCE A DAY
Status: ACTIVE | COMMUNITY

## 2021-08-31 NOTE — HPI-TODAY'S VISIT:
The patient returns for f/u care.  He had been tube feed dependent, but on his last visit in April, he was felt to be eating well and not using the tube for 2 months.  He was having some epigastric pain and possible melena and was to have EGD and tube removal.  The patient has a history of PUD.  He has a history of gastric surgery, partial gastrectomy.  In 2019 the patient had a food impaction requiring EGD and disimpaction.  Gastrojejunostomy was noted. He has been using the G tube only for medication.  He thinks he can swallow his medication.  He has gained 10 pounds in the past years, 4.5 pounds since April. He has soft food for breakfast, chicken or hotdogs,  hamburgers, pork chops for lunch.  He has no dysphagia. He is on Eliquis for a hx of CVA and possibly CHF and has been taking is through today.  It has been prescribed by Dr. Azul.

## 2021-08-31 NOTE — PHYSICAL EXAM GASTROINTESTINAL
Abdomen , soft, nontender, nondistended , no guarding or rigidity , no masses palpable , normal bowel sounds , Liver and Spleen , no hepatomegaly present , no hepatosplenomegaly , liver nontender , spleen not palpable.  Old G tube in LUQ, occluded and worn.  The site is clean and dry.

## 2021-09-07 ENCOUNTER — OFFICE VISIT (OUTPATIENT)
Dept: URBAN - METROPOLITAN AREA CLINIC 109 | Facility: CLINIC | Age: 76
End: 2021-09-07
Payer: MEDICARE

## 2021-09-07 ENCOUNTER — LAB OUTSIDE AN ENCOUNTER (OUTPATIENT)
Dept: URBAN - METROPOLITAN AREA CLINIC 109 | Facility: CLINIC | Age: 76
End: 2021-09-07

## 2021-09-07 DIAGNOSIS — K94.23 GASTROSTOMY MALFUNCTION: ICD-10-CM

## 2021-09-07 PROCEDURE — 99213 OFFICE O/P EST LOW 20 MIN: CPT | Performed by: INTERNAL MEDICINE

## 2021-09-07 RX ORDER — METOPROLOL SUCCINATE 50 MG/1
1 TABLET TABLET, FILM COATED, EXTENDED RELEASE ORAL ONCE A DAY
Status: ACTIVE | COMMUNITY

## 2021-09-07 RX ORDER — METHIMAZOLE 5 MG/1
1 TABLET TABLET ORAL ONCE A DAY
Status: ACTIVE | COMMUNITY

## 2021-09-07 RX ORDER — OMEPRAZOLE 40 MG/1
1 CAPSULE 30 MINUTES BEFORE MORNING MEAL CAPSULE, DELAYED RELEASE ORAL ONCE A DAY
Status: ACTIVE | COMMUNITY

## 2021-09-07 RX ORDER — CYPROHEPTADINE HYDROCHLORIDE 4 MG/1
1 TABLET TABLET ORAL TWICE A DAY
Status: ACTIVE | COMMUNITY

## 2021-09-07 RX ORDER — AMLODIPINE BESYLATE 5 MG/1
1 TABLET TABLET ORAL ONCE A DAY
Status: ON HOLD | COMMUNITY

## 2021-09-07 RX ORDER — VITAMIN A 2400 MCG
1 TABLET CAPSULE ORAL ONCE A DAY
Status: ACTIVE | COMMUNITY

## 2021-09-07 RX ORDER — APIXABAN 5 MG/1
AS DIRECTED TABLET, FILM COATED ORAL
Status: ACTIVE | COMMUNITY

## 2021-09-07 RX ORDER — LISINOPRIL 5 MG/1
1 TABLET TABLET ORAL ONCE A DAY
Status: ON HOLD | COMMUNITY

## 2021-09-07 RX ORDER — ATORVASTATIN CALCIUM 80 MG/1
1 TABLET TABLET, FILM COATED ORAL ONCE A DAY
Status: ACTIVE | COMMUNITY

## 2021-09-07 NOTE — HPI-TODAY'S VISIT:
The patient is no longer using his G tube for food, medication or water and desires removal.  He stopped the Eliquis 2 days ago.

## 2021-09-07 NOTE — PHYSICAL EXAM GASTROINTESTINAL
Abdomen , soft, nontender, nondistended , no guarding or rigidity , no masses palpable , normal bowel sounds , Liver and Spleen , no hepatomegaly present , no hepatosplenomegaly , liver nontender , spleen not palpable Old G tube in LUQ.  The bumper was freely movable within the gastric lumen by turning the tube. I attempted to remove the tube, however the internal bumper was hard and could not be extracted. Patient had pain and removal attempted was terminated. No bleeding was encountered.  The site was dressed.

## 2021-09-08 ENCOUNTER — TELEPHONE ENCOUNTER (OUTPATIENT)
Dept: URBAN - METROPOLITAN AREA CLINIC 94 | Facility: CLINIC | Age: 76
End: 2021-09-08

## 2021-09-08 RX ORDER — CYPROHEPTADINE HYDROCHLORIDE 4 MG/1
1 TABLET TABLET ORAL TWICE A DAY
Qty: 60 | Refills: 0

## 2021-09-14 ENCOUNTER — OFFICE VISIT (OUTPATIENT)
Dept: URBAN - METROPOLITAN AREA MEDICAL CENTER 41 | Facility: MEDICAL CENTER | Age: 76
End: 2021-09-14
Payer: MEDICARE

## 2021-09-14 ENCOUNTER — HOSPITAL ENCOUNTER (OUTPATIENT)
Dept: HOSPITAL 5 - GIO | Age: 76
Discharge: HOME | End: 2021-09-14
Attending: INTERNAL MEDICINE
Payer: MEDICARE

## 2021-09-14 VITALS — SYSTOLIC BLOOD PRESSURE: 133 MMHG | DIASTOLIC BLOOD PRESSURE: 85 MMHG

## 2021-09-14 DIAGNOSIS — T18.2XXA FOREIGN BODY IN STOMACH: ICD-10-CM

## 2021-09-14 DIAGNOSIS — Z88.0: ICD-10-CM

## 2021-09-14 DIAGNOSIS — Z79.899: ICD-10-CM

## 2021-09-14 DIAGNOSIS — E05.90: ICD-10-CM

## 2021-09-14 DIAGNOSIS — K94.23: Primary | ICD-10-CM

## 2021-09-14 DIAGNOSIS — E03.9: ICD-10-CM

## 2021-09-14 DIAGNOSIS — Z88.1: ICD-10-CM

## 2021-09-14 DIAGNOSIS — I50.9: ICD-10-CM

## 2021-09-14 DIAGNOSIS — I48.91: ICD-10-CM

## 2021-09-14 DIAGNOSIS — E11.9: ICD-10-CM

## 2021-09-14 DIAGNOSIS — J44.9: ICD-10-CM

## 2021-09-14 DIAGNOSIS — K21.9: ICD-10-CM

## 2021-09-14 DIAGNOSIS — M19.90: ICD-10-CM

## 2021-09-14 DIAGNOSIS — D64.9: ICD-10-CM

## 2021-09-14 DIAGNOSIS — F17.200: ICD-10-CM

## 2021-09-14 DIAGNOSIS — Z98.890: ICD-10-CM

## 2021-09-14 PROCEDURE — 43247 EGD REMOVE FOREIGN BODY: CPT | Performed by: INTERNAL MEDICINE

## 2021-09-14 PROCEDURE — A6250 SKIN SEAL PROTECT MOISTURIZR: HCPCS

## 2021-09-14 PROCEDURE — 43247 EGD REMOVE FOREIGN BODY: CPT

## 2021-09-14 PROCEDURE — 0DP64UZ REMOVAL OF FEEDING DEVICE FROM STOMACH, PERCUTANEOUS ENDOSCOPIC APPROACH: ICD-10-PCS

## 2021-09-14 PROCEDURE — 82962 GLUCOSE BLOOD TEST: CPT

## 2021-09-14 RX ORDER — METOPROLOL SUCCINATE 50 MG/1
1 TABLET TABLET, FILM COATED, EXTENDED RELEASE ORAL ONCE A DAY
Status: ACTIVE | COMMUNITY

## 2021-09-14 RX ORDER — CYPROHEPTADINE HYDROCHLORIDE 4 MG/1
1 TABLET TABLET ORAL TWICE A DAY
Qty: 60 | Refills: 0 | Status: ACTIVE | COMMUNITY

## 2021-09-14 RX ORDER — ATORVASTATIN CALCIUM 80 MG/1
1 TABLET TABLET, FILM COATED ORAL ONCE A DAY
Status: ACTIVE | COMMUNITY

## 2021-09-14 RX ORDER — OMEPRAZOLE 40 MG/1
1 CAPSULE 30 MINUTES BEFORE MORNING MEAL CAPSULE, DELAYED RELEASE ORAL ONCE A DAY
Status: ACTIVE | COMMUNITY

## 2021-09-14 RX ORDER — METHIMAZOLE 5 MG/1
1 TABLET TABLET ORAL ONCE A DAY
Status: ACTIVE | COMMUNITY

## 2021-09-14 RX ORDER — LISINOPRIL 5 MG/1
1 TABLET TABLET ORAL ONCE A DAY
Status: ON HOLD | COMMUNITY

## 2021-09-14 RX ORDER — AMLODIPINE BESYLATE 5 MG/1
1 TABLET TABLET ORAL ONCE A DAY
Status: ON HOLD | COMMUNITY

## 2021-09-14 RX ORDER — APIXABAN 5 MG/1
AS DIRECTED TABLET, FILM COATED ORAL
Status: ACTIVE | COMMUNITY

## 2021-09-14 RX ORDER — VITAMIN A 2400 MCG
1 TABLET CAPSULE ORAL ONCE A DAY
Status: ACTIVE | COMMUNITY

## 2021-09-14 NOTE — OPERATIVE REPORT
Operative Report


Operative Report: 





Date of procedure: 9/14/2021


 


Procedure: Esophagogastroduodenoscopy with removal of foreign body and (old gas

trostomy tube)


 


Preprocedure diagnosis: Malfunction of gastrostomy tube, no longer in use.  The 

tube could not be extracted percutaneously in an office setting.


 


Post procedure diagnosis: Partial gastrectomy status.  Successful removal of old

gastrostomy tube.


 


Endoscopist: Dr. Beard


 


Anesthesia: Monitored anesthesia care per anesthesia department


 


Medications: Propofol per anesthesia


 


Estimated blood loss: 0


 


After careful discussion of the nature and purpose of the procedure as well as 

details the technique risks benefits and alternatives consent was obtained.  The

patient was placed in the left lateral decubitus position and medicated per 

anesthesia.  The tip of the [employer]  video scope was passed per orum under

direct vision into the esophagus and advanced into the stomach and descending 

duodenum.  The descending duodenum the duodenal bulb and pylorus were 

symmetrical and normal.  The scope was withdrawn into the stomach and the 

stomach then gently insufflated with air.  The antrum was surgically absent 

consistent with partial gastrectomy status.  The stomach was further insufflated

and the scope was then retroflexed and partially withdrawn.  The cardia, fundus,

and body of the remaining stomach were within normal limits and easily 

distensible.


The old gastrostomy tube bumper was located on the anterior wall of the stomach 

body.  The bumper of the tube was grasped tightly with the snare and the 

external portion severed.  The G-tube was successfully removed without 

difficulty for oral on the tip of the scope via the snare.   The esophagogastric

junction was at 40 cm.  The esophageal body was normal throughout.  The external

gastrostomy site was dressed in a sterile fashion.  The procedure was was well 

tolerated and the patient was observed in recovery.


 


Impressions: Gastrostomy tube malfunction.  Status post G-tube removal.  Partial

gastrectomy status.


 


Plan: Daily dressing change until stable per family.  Return to the office as 

needed.


 


 


Electronically signed:  Zach Beard MD

## 2021-09-14 NOTE — SHORT STAY SUMMARY
Short Stay Documentation


Date of service: 09/14/21


Narrative H&P: 





The patient presents for endoscopic removal of an old G tube, which could not be

extracted percutaneously in the office.





- History


H&P: obtained from office





- Allergies and Medications


Current Medications: 


                                    Allergies





ampicillin Adverse Reaction (Verified 09/09/21 13:54)


   Itching


Penicillins Adverse Reaction (Verified 03/07/20 14:24)


   Itching





                                Home Medications











 Medication  Instructions  Recorded  Confirmed  Last Taken  Type


 


Acetaminophen [Non-Aspirin Extra 500 mg PO Q6H 10/21/19 03/08/20 10/20/19 

History





Strength]     


 


Apixaban [Eliquis] 2.5 mg PO Q12HR #60 tablet 03/10/20  Unknown Rx


 


Esomeprazole Magnesium [NexIUM] 40 mg PO QDAY #30 03/10/20  Unknown Rx


 


Metoprolol Xl [Metoprolol 50 mg PO Q12H #60 tablet 03/10/20  Unknown Rx





SUCCINATE ER TAB]     


 


lisinopriL [Zestril TAB] 2.5 mg PO QDAY #30 tablet 03/10/20  Unknown Rx


 


methIMAzole [Tapazole] 5 mg PO Q8HR #90 tablet 03/10/20  Unknown Rx








Active Medications





Sodium Chloride (Nacl 0.9% 1000 Ml)  1,000 mls @ 50 mls/hr IV AS DIRECT CLAUDIA











- Brief post op/procedure progress note


Date of procedure: 09/14/21


Procedure: 





see dictation


Estimated blood loss: none


Pathology: none


Specimen disposition: to lab


Condition: stable





- Disposition


Condition at discharge: Good


Disposition: 01 HOME / SELF CARE / HOMELESS





- Discharge Diagnoses


(1) Malfunction of gastrostomy tube


Status: Acute   





Short Stay Discharge Plan


Activity: advance as tolerated


Weight Bearing Status: Weight Bear as Tolerated


Diet: advance as tolerated


Follow up with: 


SERGIO SALEEM NP-C [Primary Care Provider] - 7 Days

## 2021-09-14 NOTE — ANESTHESIA DAY OF SURGERY
Anesthesia Day of Surgery





- Day of Surgery


Patient Examined: Yes


Patient H&P Reviewed: Yes


Patient is NPO: Yes


Cardiac Clearance: Yes

## 2021-09-14 NOTE — ANESTHESIA CONSULTATION
Anesthesia Consult and Med Hx


Date of service: 09/14/21





- Airway


Anesthetic Teeth Evaluation: Edentulous


ROM Head & Neck: Adequate


Mental/Hyoid Distance: Adequate


Mallampati Class: Class III


Intubation Access Assessment: Possibly Difficult





- Pre-Operative Health Status


ASA Pre-Surgery Classification: ASA3


Proposed Anesthetic Plan: MAC





- Pulmonary


Hx Smoking: Yes (quit several months ago)


Hx Respiratory Symptoms: No





- Cardiovascular System


Hx Hypertension: No


Hx Heart Attack/AMI: Yes (2020)


Hx Percutaneous Transluminal Coronary Angioplasty (PTCA): No


Hx Cardia Arrhythmia: Yes (paroxysmal a-fib; off Eliquis x 2 days)


Hx Pacemaker: No


Hx Internal Defibrillator: No





- Central Nervous System


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





- Gastrointestinal


Hx Gastroesophageal Reflux Disease: Yes (with hx pulmonary aspiration after 

eating)





- Endocrine


Hx Renal Disease: No


Hx Liver Disease: No


Hx Non-Insulin Dependent Diabetes: Yes


Hx Hypothyroidism: Yes





- Hematic


Hx Anemia: Yes





- Other Systems


Hx Obesity: No





- Additional Comments


Anesthesia Medical History Comments: No hx anesthetic complications. Scheduled 

for PEG removal. Recent outpatient cardiology visit and EKG on chart.

## 2021-09-23 ENCOUNTER — OFFICE VISIT (OUTPATIENT)
Dept: URBAN - METROPOLITAN AREA CLINIC 109 | Facility: CLINIC | Age: 76
End: 2021-09-23
Payer: MEDICARE

## 2021-09-23 ENCOUNTER — LAB OUTSIDE AN ENCOUNTER (OUTPATIENT)
Dept: URBAN - METROPOLITAN AREA CLINIC 109 | Facility: CLINIC | Age: 76
End: 2021-09-23

## 2021-09-23 DIAGNOSIS — K94.23 GASTROSTOMY MALFUNCTION: ICD-10-CM

## 2021-09-23 PROCEDURE — 99212 OFFICE O/P EST SF 10 MIN: CPT | Performed by: INTERNAL MEDICINE

## 2021-09-23 RX ORDER — CYPROHEPTADINE HYDROCHLORIDE 4 MG/1
1 TABLET TABLET ORAL TWICE A DAY
Qty: 60 | Refills: 0 | Status: ACTIVE | COMMUNITY

## 2021-09-23 RX ORDER — APIXABAN 5 MG/1
AS DIRECTED TABLET, FILM COATED ORAL
Status: ACTIVE | COMMUNITY

## 2021-09-23 RX ORDER — CIPROFLOXACIN HYDROCHLORIDE 500 MG/1
1 TABLET TABLET, FILM COATED ORAL
Qty: 20 | OUTPATIENT
Start: 2021-09-23 | End: 2021-10-03

## 2021-09-23 RX ORDER — LISINOPRIL 5 MG/1
1 TABLET TABLET ORAL ONCE A DAY
Status: ON HOLD | COMMUNITY

## 2021-09-23 RX ORDER — VITAMIN A 2400 MCG
1 TABLET CAPSULE ORAL ONCE A DAY
Status: ACTIVE | COMMUNITY

## 2021-09-23 RX ORDER — METOPROLOL SUCCINATE 50 MG/1
1 TABLET TABLET, FILM COATED, EXTENDED RELEASE ORAL ONCE A DAY
Status: ACTIVE | COMMUNITY

## 2021-09-23 RX ORDER — OMEPRAZOLE 40 MG/1
1 CAPSULE 30 MINUTES BEFORE MORNING MEAL CAPSULE, DELAYED RELEASE ORAL ONCE A DAY
Status: ACTIVE | COMMUNITY

## 2021-09-23 RX ORDER — METHIMAZOLE 5 MG/1
1 TABLET TABLET ORAL ONCE A DAY
Status: ACTIVE | COMMUNITY

## 2021-09-23 RX ORDER — ATORVASTATIN CALCIUM 80 MG/1
1 TABLET TABLET, FILM COATED ORAL ONCE A DAY
Status: ACTIVE | COMMUNITY

## 2021-09-23 RX ORDER — AMLODIPINE BESYLATE 5 MG/1
1 TABLET TABLET ORAL ONCE A DAY
Status: ON HOLD | COMMUNITY

## 2021-09-23 NOTE — PHYSICAL EXAM GASTROINTESTINAL
Abdomen , soft, nontender, nondistended , no guarding or rigidity , no masses palpable , normal bowel sounds , Liver and Spleen , no hepatomegaly present , no hepatosplenomegaly , liver nontender , spleen not palpable. Gastrostomy stoma in LUQ  with a small fistulous opening with no visible leakage although some fluid on the dressing applied 24 hours ago by the caregiver..  Skin intact and healthy around the gastrostomy site. Mild induration and tenderness is present.  No fluctuance or purulent drainage. The stoma was probed with a silver nitrate cautery stick

## 2021-09-23 NOTE — HPI-TODAY'S VISIT:
The patient returns for care with a c/o some persistent leaking from the gastrostomy site.  He had had G tube since March 2020 and never changed, but no longer in use.  The G tube could not be removed by the office attempt percutaneously and the patient subsequently underwent EGD on 9/14 for removal. The patient fell down the stairs the day after endocopy.  There is mild soreness around the stoma.

## 2021-10-05 ENCOUNTER — OFFICE VISIT (OUTPATIENT)
Dept: URBAN - METROPOLITAN AREA CLINIC 109 | Facility: CLINIC | Age: 76
End: 2021-10-05
Payer: MEDICARE

## 2021-10-05 DIAGNOSIS — K94.23 GASTROSTOMY MALFUNCTION: ICD-10-CM

## 2021-10-05 PROCEDURE — 99212 OFFICE O/P EST SF 10 MIN: CPT | Performed by: INTERNAL MEDICINE

## 2021-10-05 RX ORDER — LISINOPRIL 5 MG/1
1 TABLET TABLET ORAL ONCE A DAY
Status: ON HOLD | COMMUNITY

## 2021-10-05 RX ORDER — CYPROHEPTADINE HYDROCHLORIDE 4 MG/1
1 TABLET TABLET ORAL TWICE A DAY
Qty: 60 | Refills: 0 | Status: ACTIVE | COMMUNITY

## 2021-10-05 RX ORDER — METOPROLOL SUCCINATE 50 MG/1
1 TABLET TABLET, FILM COATED, EXTENDED RELEASE ORAL ONCE A DAY
Status: ACTIVE | COMMUNITY

## 2021-10-05 RX ORDER — METHIMAZOLE 5 MG/1
1 TABLET TABLET ORAL ONCE A DAY
Status: ACTIVE | COMMUNITY

## 2021-10-05 RX ORDER — VITAMIN A 2400 MCG
1 TABLET CAPSULE ORAL ONCE A DAY
Status: ACTIVE | COMMUNITY

## 2021-10-05 RX ORDER — AMLODIPINE BESYLATE 5 MG/1
1 TABLET TABLET ORAL ONCE A DAY
Status: ON HOLD | COMMUNITY

## 2021-10-05 RX ORDER — OMEPRAZOLE 40 MG/1
1 CAPSULE 30 MINUTES BEFORE MORNING MEAL CAPSULE, DELAYED RELEASE ORAL ONCE A DAY
Status: ACTIVE | COMMUNITY

## 2021-10-05 RX ORDER — ATORVASTATIN CALCIUM 80 MG/1
1 TABLET TABLET, FILM COATED ORAL ONCE A DAY
Status: ACTIVE | COMMUNITY

## 2021-10-05 RX ORDER — APIXABAN 5 MG/1
AS DIRECTED TABLET, FILM COATED ORAL
Status: ACTIVE | COMMUNITY

## 2021-10-05 NOTE — HPI-TODAY'S VISIT:
The patient returns for a minimally leaking gastrostomy site.  The G tube was removed about 2 weeks ago endoscopically as the bumper was hardened.  The original G was placed 18 months ago and never changed. He does not have any discomfort at the site. The dressing was replaced this morning and no leakage has occurred.

## 2021-10-05 NOTE — PHYSICAL EXAM GASTROINTESTINAL
Abdomen , soft, nontender, nondistended , no guarding or rigidity , no masses palpable , normal bowel sounds , Liver and Spleen , no hepatomegaly present , no hepatosplenomegaly , liver nontender , spleen not palpable. Old gastrostomy site in LUQ.  Dry dressings, no leakage apparent.

## 2021-10-12 ENCOUNTER — OFFICE VISIT (OUTPATIENT)
Dept: URBAN - METROPOLITAN AREA MEDICAL CENTER 41 | Facility: MEDICAL CENTER | Age: 76
End: 2021-10-12

## 2021-10-12 ENCOUNTER — TELEPHONE ENCOUNTER (OUTPATIENT)
Dept: URBAN - METROPOLITAN AREA CLINIC 109 | Facility: CLINIC | Age: 76
End: 2021-10-12

## 2021-11-09 ENCOUNTER — CLAIMS CREATED FROM THE CLAIM WINDOW (OUTPATIENT)
Dept: URBAN - METROPOLITAN AREA CLINIC 109 | Facility: CLINIC | Age: 76
End: 2021-11-09
Payer: MEDICARE

## 2021-11-09 DIAGNOSIS — K94.23 GASTROSTOMY MALFUNCTION: ICD-10-CM

## 2021-11-09 PROCEDURE — 99212 OFFICE O/P EST SF 10 MIN: CPT | Performed by: INTERNAL MEDICINE

## 2021-11-09 RX ORDER — VITAMIN A 2400 MCG
1 TABLET CAPSULE ORAL ONCE A DAY
Status: ACTIVE | COMMUNITY

## 2021-11-09 RX ORDER — METOPROLOL SUCCINATE 50 MG/1
1 TABLET TABLET, FILM COATED, EXTENDED RELEASE ORAL ONCE A DAY
Status: ACTIVE | COMMUNITY

## 2021-11-09 RX ORDER — LISINOPRIL 5 MG/1
1 TABLET TABLET ORAL ONCE A DAY
Status: ON HOLD | COMMUNITY

## 2021-11-09 RX ORDER — APIXABAN 5 MG/1
AS DIRECTED TABLET, FILM COATED ORAL
Status: ACTIVE | COMMUNITY

## 2021-11-09 RX ORDER — ATORVASTATIN CALCIUM 80 MG/1
1 TABLET TABLET, FILM COATED ORAL ONCE A DAY
Status: ACTIVE | COMMUNITY

## 2021-11-09 RX ORDER — METHIMAZOLE 5 MG/1
1 TABLET TABLET ORAL ONCE A DAY
Status: ACTIVE | COMMUNITY

## 2021-11-09 RX ORDER — CYPROHEPTADINE HYDROCHLORIDE 4 MG/1
1 TABLET TABLET ORAL TWICE A DAY
Qty: 60 | Refills: 0 | Status: ACTIVE | COMMUNITY

## 2021-11-09 RX ORDER — TIZANIDINE 4 MG/1
1 TABLET AS NEEDED TABLET ORAL AS NEEDED
Status: ACTIVE | COMMUNITY

## 2021-11-09 RX ORDER — OMEPRAZOLE 40 MG/1
1 CAPSULE 30 MINUTES BEFORE MORNING MEAL CAPSULE, DELAYED RELEASE ORAL ONCE A DAY
Status: ACTIVE | COMMUNITY

## 2021-11-09 RX ORDER — AMLODIPINE BESYLATE 5 MG/1
1 TABLET TABLET ORAL ONCE A DAY
Status: ON HOLD | COMMUNITY

## 2021-11-09 NOTE — HPI-TODAY'S VISIT:
The patient has a history of a G tube required in  March 2020 placed at [facility].  The tube was required endoscopic removal due to a hardened bumper due to the length of time, over a year, the tube had been placed.  The gastrostomy site constinued to have a slow leakage after removal which never quite sealed.  He was sent for surgical evaluation about a month ago for this reason. He will have surgery when cleared by the cardiologist. A small amount of leakage is still occurring especially when he has a big meal or lies down.

## 2021-11-09 NOTE — PHYSICAL EXAM GASTROINTESTINAL
Abdomen , soft, nontender, nondistended , no guarding or rigidity , no masses palpable , normal bowel sounds , Liver and Spleen , no hepatomegaly present , no hepatosplenomegaly , liver nontender , spleen not palpable. Old gastrostomy site in LUQ.  Currently clean and dry.  Some skin changes around the stoma c/w mild leakage.

## 2022-03-08 ENCOUNTER — OFFICE VISIT (OUTPATIENT)
Dept: URBAN - METROPOLITAN AREA CLINIC 109 | Facility: CLINIC | Age: 77
End: 2022-03-08

## 2022-03-31 ENCOUNTER — CLAIMS CREATED FROM THE CLAIM WINDOW (OUTPATIENT)
Dept: URBAN - METROPOLITAN AREA CLINIC 109 | Facility: CLINIC | Age: 77
End: 2022-03-31
Payer: MEDICARE

## 2022-03-31 ENCOUNTER — LAB OUTSIDE AN ENCOUNTER (OUTPATIENT)
Dept: URBAN - METROPOLITAN AREA CLINIC 109 | Facility: CLINIC | Age: 77
End: 2022-03-31

## 2022-03-31 VITALS
HEIGHT: 76 IN | DIASTOLIC BLOOD PRESSURE: 70 MMHG | TEMPERATURE: 97.7 F | BODY MASS INDEX: 18.02 KG/M2 | HEART RATE: 71 BPM | SYSTOLIC BLOOD PRESSURE: 106 MMHG | WEIGHT: 148 LBS

## 2022-03-31 DIAGNOSIS — K94.23 GASTROSTOMY MALFUNCTION: ICD-10-CM

## 2022-03-31 DIAGNOSIS — R13.19 ESOPHAGEAL DYSPHAGIA: ICD-10-CM

## 2022-03-31 DIAGNOSIS — K21.9 GASTROESOPHAGEAL REFLUX DISEASE WITHOUT ESOPHAGITIS: ICD-10-CM

## 2022-03-31 PROCEDURE — 99213 OFFICE O/P EST LOW 20 MIN: CPT | Performed by: INTERNAL MEDICINE

## 2022-03-31 RX ORDER — OMEPRAZOLE 40 MG/1
1 CAPSULE 30 MINUTES BEFORE MORNING MEAL CAPSULE, DELAYED RELEASE ORAL ONCE A DAY
OUTPATIENT

## 2022-03-31 RX ORDER — LISINOPRIL 5 MG/1
1 TABLET TABLET ORAL ONCE A DAY
Status: ON HOLD | COMMUNITY

## 2022-03-31 RX ORDER — VITAMIN A 2400 MCG
1 TABLET CAPSULE ORAL ONCE A DAY
Status: ACTIVE | COMMUNITY

## 2022-03-31 RX ORDER — CYPROHEPTADINE HYDROCHLORIDE 4 MG/1
1 TABLET TABLET ORAL TWICE A DAY
Qty: 60 | Refills: 0 | Status: ACTIVE | COMMUNITY

## 2022-03-31 RX ORDER — APIXABAN 5 MG/1
AS DIRECTED TABLET, FILM COATED ORAL
Status: ACTIVE | COMMUNITY

## 2022-03-31 RX ORDER — TIZANIDINE 4 MG/1
1 TABLET AS NEEDED TABLET ORAL AS NEEDED
Status: ACTIVE | COMMUNITY

## 2022-03-31 RX ORDER — ATORVASTATIN CALCIUM 80 MG/1
1 TABLET TABLET, FILM COATED ORAL ONCE A DAY
Status: ACTIVE | COMMUNITY

## 2022-03-31 RX ORDER — METHIMAZOLE 5 MG/1
1 TABLET TABLET ORAL ONCE A DAY
Status: ACTIVE | COMMUNITY

## 2022-03-31 RX ORDER — OMEPRAZOLE 40 MG/1
1 CAPSULE 30 MINUTES BEFORE MORNING MEAL CAPSULE, DELAYED RELEASE ORAL ONCE A DAY
Status: ACTIVE | COMMUNITY

## 2022-03-31 RX ORDER — AMLODIPINE BESYLATE 5 MG/1
1 TABLET TABLET ORAL ONCE A DAY
Status: ON HOLD | COMMUNITY

## 2022-03-31 RX ORDER — METOPROLOL SUCCINATE 50 MG/1
1 TABLET TABLET, FILM COATED, EXTENDED RELEASE ORAL ONCE A DAY
Status: ACTIVE | COMMUNITY

## 2022-03-31 NOTE — HPI-TODAY'S VISIT:
The patient returns for f/u care and has a history of a G tube required in  March 2020 placed at [place].  The tube was required endoscopic removal due to a hardened bumper due to the length of time, over a year, the tube had been placed.  The gastrostomy site constinued to have a slow leakage after removal which never quite sealed.  He was sent for surgical evaluation and was to have surgery when cleared by the cardiologist. He saw the surgeon, but the gastrostomy closed and surgery was not done. He has some mild leakage still, but not very bothersome and in tiny amounts. He reports GERD sx and has some dysphagia to meats and bread, but not liquids.

## 2022-03-31 NOTE — PHYSICAL EXAM GASTROINTESTINAL
Abdomen , soft, nontender, nondistended , no guarding or rigidity , no masses palpable , normal bowel sounds , Liver and Spleen , no hepatomegaly present , no hepatosplenomegaly , liver nontender , spleen not palpable, two old gastrostomy sites appear clean and dry

## 2022-04-01 ENCOUNTER — TELEPHONE ENCOUNTER (OUTPATIENT)
Dept: URBAN - METROPOLITAN AREA CLINIC 109 | Facility: CLINIC | Age: 77
End: 2022-04-01

## 2022-06-16 ENCOUNTER — OFFICE VISIT (OUTPATIENT)
Dept: URBAN - METROPOLITAN AREA MEDICAL CENTER 16 | Facility: MEDICAL CENTER | Age: 77
End: 2022-06-16
Payer: MEDICARE

## 2022-06-16 ENCOUNTER — TELEPHONE ENCOUNTER (OUTPATIENT)
Dept: URBAN - METROPOLITAN AREA CLINIC 92 | Facility: CLINIC | Age: 77
End: 2022-06-16

## 2022-06-16 DIAGNOSIS — K22.89 DILATATION OF ESOPHAGUS: ICD-10-CM

## 2022-06-16 DIAGNOSIS — K22.2 ACQUIRED ESOPHAGEAL RING: ICD-10-CM

## 2022-06-16 PROBLEM — 266435005: Status: ACTIVE | Noted: 2022-03-31

## 2022-06-16 PROCEDURE — 43239 EGD BIOPSY SINGLE/MULTIPLE: CPT | Performed by: INTERNAL MEDICINE

## 2022-06-16 PROCEDURE — 43249 ESOPH EGD DILATION <30 MM: CPT | Performed by: INTERNAL MEDICINE

## 2022-06-16 RX ORDER — OMEPRAZOLE 40 MG/1
1 CAPSULE 30 MINUTES BEFORE MORNING MEAL CAPSULE, DELAYED RELEASE ORAL ONCE A DAY
Status: ACTIVE | COMMUNITY

## 2022-06-16 RX ORDER — METHIMAZOLE 5 MG/1
1 TABLET TABLET ORAL ONCE A DAY
Status: ACTIVE | COMMUNITY

## 2022-06-16 RX ORDER — ATORVASTATIN CALCIUM 80 MG/1
1 TABLET TABLET, FILM COATED ORAL ONCE A DAY
Status: ACTIVE | COMMUNITY

## 2022-06-16 RX ORDER — CYPROHEPTADINE HYDROCHLORIDE 4 MG/1
1 TABLET TABLET ORAL TWICE A DAY
Qty: 60 | Refills: 0 | Status: ACTIVE | COMMUNITY

## 2022-06-16 RX ORDER — OMEPRAZOLE 40 MG/1
1 CAPSULE CAPSULE, DELAYED RELEASE ORAL TWICE DAILY
Qty: 60 | Refills: 1

## 2022-06-16 RX ORDER — AMLODIPINE BESYLATE 5 MG/1
1 TABLET TABLET ORAL ONCE A DAY
Status: ON HOLD | COMMUNITY

## 2022-06-16 RX ORDER — VITAMIN A 2400 MCG
1 TABLET CAPSULE ORAL ONCE A DAY
Status: ACTIVE | COMMUNITY

## 2022-06-16 RX ORDER — APIXABAN 5 MG/1
AS DIRECTED TABLET, FILM COATED ORAL
Status: ACTIVE | COMMUNITY

## 2022-06-16 RX ORDER — METOPROLOL SUCCINATE 50 MG/1
1 TABLET TABLET, FILM COATED, EXTENDED RELEASE ORAL ONCE A DAY
Status: ACTIVE | COMMUNITY

## 2022-06-16 RX ORDER — TIZANIDINE 4 MG/1
1 TABLET AS NEEDED TABLET ORAL AS NEEDED
Status: ACTIVE | COMMUNITY

## 2022-06-16 RX ORDER — LISINOPRIL 5 MG/1
1 TABLET TABLET ORAL ONCE A DAY
Status: ON HOLD | COMMUNITY

## 2023-12-22 ENCOUNTER — OFFICE VISIT (OUTPATIENT)
Dept: URBAN - METROPOLITAN AREA CLINIC 109 | Facility: CLINIC | Age: 78
End: 2023-12-22

## 2024-02-16 ENCOUNTER — OV EP (OUTPATIENT)
Dept: URBAN - METROPOLITAN AREA CLINIC 109 | Facility: CLINIC | Age: 79
End: 2024-02-16
Payer: MEDICARE

## 2024-02-16 VITALS
TEMPERATURE: 97.7 F | WEIGHT: 146.6 LBS | HEART RATE: 69 BPM | BODY MASS INDEX: 17.85 KG/M2 | DIASTOLIC BLOOD PRESSURE: 73 MMHG | HEIGHT: 76 IN | SYSTOLIC BLOOD PRESSURE: 116 MMHG

## 2024-02-16 DIAGNOSIS — K22.2 ESOPHAGEAL STENOSIS: ICD-10-CM

## 2024-02-16 DIAGNOSIS — K21.9 GASTROESOPHAGEAL REFLUX DISEASE WITHOUT ESOPHAGITIS: ICD-10-CM

## 2024-02-16 PROCEDURE — 99214 OFFICE O/P EST MOD 30 MIN: CPT | Performed by: INTERNAL MEDICINE

## 2024-02-16 RX ORDER — LISINOPRIL 5 MG/1
1 TABLET TABLET ORAL ONCE A DAY
Status: ON HOLD | COMMUNITY

## 2024-02-16 RX ORDER — AMLODIPINE BESYLATE 5 MG/1
1 TABLET TABLET ORAL ONCE A DAY
Status: ON HOLD | COMMUNITY

## 2024-02-16 RX ORDER — CYPROHEPTADINE HYDROCHLORIDE 4 MG/1
1 TABLET TABLET ORAL TWICE A DAY
Qty: 60 | Refills: 0 | Status: ACTIVE | COMMUNITY

## 2024-02-16 RX ORDER — APIXABAN 5 MG/1
AS DIRECTED TABLET, FILM COATED ORAL
Status: ACTIVE | COMMUNITY

## 2024-02-16 RX ORDER — ATORVASTATIN CALCIUM 80 MG/1
1 TABLET TABLET, FILM COATED ORAL ONCE A DAY
Status: ACTIVE | COMMUNITY

## 2024-02-16 RX ORDER — VITAMIN A 2400 MCG
1 TABLET CAPSULE ORAL ONCE A DAY
Status: ACTIVE | COMMUNITY

## 2024-02-16 RX ORDER — METHIMAZOLE 5 MG/1
1 TABLET TABLET ORAL ONCE A DAY
Status: ACTIVE | COMMUNITY

## 2024-02-16 RX ORDER — TIZANIDINE 4 MG/1
1 TABLET AS NEEDED TABLET ORAL AS NEEDED
Status: ACTIVE | COMMUNITY

## 2024-02-16 RX ORDER — OMEPRAZOLE 40 MG/1
1 CAPSULE CAPSULE, DELAYED RELEASE ORAL TWICE DAILY
Qty: 60 | Refills: 1 | Status: ACTIVE | COMMUNITY

## 2024-02-16 RX ORDER — METOPROLOL SUCCINATE 50 MG/1
1 TABLET TABLET, FILM COATED, EXTENDED RELEASE ORAL ONCE A DAY
Status: ACTIVE | COMMUNITY

## 2024-02-16 NOTE — HPI-TODAY'S VISIT:
here for followup of yet another food impaction, has mild recurrent prox esophageal stenosis  11/2023 Dr Ahumada Food was found in the upper third of the esophagus. 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 The gastrojejunal anastomosis was characterized byhealthy appearing mucosa. This was traversed. The efferent limb was examined. The afferent limb was examined.- The examined duodenum was normal.  6/2022 EGD Dr Heath  FINDINGS: 1. Post surgical changes in the stomach, consistent with history of Billroth II. The scope advanced to the efferent and afferent limbs. There was mild bleeding around the gastrojejunal anastomosis with trauma due scope passage but it was limited with clotting. No obvious signs of ulcer at the anastomosis. 2. The small bowel mucosa appeared normal. 3. The gastric mucosa appeared normal. 4. LA grade A esophagitis at the distal esophagus/GE junction. Biopsies obtained. 5. There was mild narrowing with possible stricture but no resistance to the scope passage in the upper esophagus. Balloon dilation performed with TTS balloon dilator 10-11-12 cm, up to 12 cm.  RECOMMENDATIONS: 1. Resume soft diet. Avoid large pieces of meat and bread. 2. Omeprazole 40 mg bid. 3. Resume eliquis in 2 days as previous dosing if no symptoms of bleeding.   EGD with me 2019 for a food impaction again in the prox esophagus, and possible proximal esophageal diverticulum  3/2022 dr sneed The patient returns for f/u care and has a history of a G tube required in  March 2020 placed at [place].  The tube was required endoscopic removal due to a hardened bumper due to the length of time, over a year, the tube had been placed.  The gastrostomy site constinued to have a slow leakage after removal which never quite sealed.  He was sent for surgical evaluation and was to have surgery when cleared by the cardiologist. He saw the surgeon, but the gastrostomy closed and surgery was not done. He has some mild leakage still, but not very bothersome and in tiny amounts. He reports GERD sx and has some dysphagia to meats and bread, but not liquids.    ****************************** cards results from 5/2023 STRESS TEST:   Technically suboptimal study.   Normal stress and rest myocardial perfusion imaging.   Normal global left ventricular systolic function with no significant wall motion abnormalities. EF: 63%.   No evidence of significant stress-induced ischemia or prior infarction.  Resting ECG ECG is normal. Resting ECG shows no ST-segment deviation. The ECG shows normal sinus rhythm.  Stress Findings A pharmacological stress test was performed using 0.4mg regadenoson. The patient exercised for 2.0 min and had a maximal HR of 91 BPM (64 % of MPHR) 1.0 METS. The patient reported dyspnea and fatigue during the stress test. Symptoms began at minute 1.0 during stress and ended at minute 4.0 during recovery. Patient was given 50mg of aminophylline 4.0 minutes after stress injection. Stress test was terminated per protocol. Blood pressure demonstrated a normal response to stress. The one minute heart rate recovery was 84 bpm. The two minute heart rate recovery was 81 bpm.  Stress ECG No ST deviation was noted. There were no arrhythmias during stress. There were no arrhythmias during recovery. The ECG was negative for ischemia.  Isotope Administration At rest,11.0 mCi of technetium tetrofosmin was administered on 5/8/2023 at 11:15 EDT. At peak stress,33.0 mCi of technetium tetrofosmin was administered on 5/8/2023 at 12:30 EDT. The injection was in the right hand.  Perfusion Comments Overall image quality is suboptimal. LV perfusion is normal at rest and stress. There is no scintigraphic evidence of myocardial ischemia or infarction. Soft tissue attenuation artifact is present.  Perfusion Defect Conclusion Stress LV cavity size is 65.0 mL. Resting LV cavity size is 56.0 mL. The stress/rest perfusion ratio is 1.16 . There is no evidence of transient ischemic dilation (TID). The TID ratio is 0.53.  Stress Function Defect Left ventricular function was normal. Gated tomographic images demonstrate an ejection fraction of 64% at rest63% post-stress.  The left ventricle is normal in size. Stress end systolic index: 18.0 mL.  Study Technique Following rest isotope administration, SPECT images of the heart were obtained. Subsequently, following stress testing and stress isotope administration, SPECT images of the heart were obtained.    TTE   Mild mitral regurgitation.   Mild tricuspid regurgitation.   Normal left ventricular size.   Mild left ventricular wall thickening.   Mild global left ventricular systolic dysfunction. EF: 45 - 50%.   Grade I left ventricular diastolic dysfunction.   Normal right ventricular size.   Mild right ventricular systolic dysfunction.  Left Ventricle Normal size. Mildly increased wall thickness. Mild global hypokinesis. Reduced systolic function with a visually estimated EF of 45 - 50%. Grade I diastolic dysfunction with normal left ventricular filling pressure.  Right Ventricle Normal size. Mildly reduced systolic function.  Left Atrium Normal size.  Right Atrium Normal size.  IVC/SVC IVC was not well visualized.  Mitral Valve Valve structure is normal. Mildly thickened leaflets. Mild mitral valve regurgitation. No stenosis.  Tricuspid Valve Valve structure is normal. Mild tricuspid valve regurgitation. No stenosis.  Aortic Valve Tricuspid. No aortic valve regurgitation. No stenosis.  Pulmonic Valve Valve structure is normal. Trace pulmonic valve regurgitation.  Ascending Aorta Normal annulus, ascending aorta and aortic arch size.  Pericardium No pericardial effusion.  Atrial Septum No ASD present on color flow Doppler.  Study Details A complete 2D, color flow Doppler and spectral Doppler echocardiogram was performed.

## 2024-03-26 ENCOUNTER — EGD (OUTPATIENT)
Dept: URBAN - METROPOLITAN AREA SURGERY CENTER 23 | Facility: SURGERY CENTER | Age: 79
End: 2024-03-26

## 2024-04-18 ENCOUNTER — LAB (OUTPATIENT)
Dept: URBAN - METROPOLITAN AREA CLINIC 4 | Facility: CLINIC | Age: 79
End: 2024-04-18
Payer: MEDICARE

## 2024-04-18 ENCOUNTER — EGD (OUTPATIENT)
Dept: URBAN - METROPOLITAN AREA SURGERY CENTER 23 | Facility: SURGERY CENTER | Age: 79
End: 2024-04-18
Payer: MEDICARE

## 2024-04-18 DIAGNOSIS — K22.2 ACQUIRED ESOPHAGEAL RING: ICD-10-CM

## 2024-04-18 DIAGNOSIS — K31.89 OTHER DISEASES OF STOMACH AND DUODENUM: ICD-10-CM

## 2024-04-18 DIAGNOSIS — R13.19 CERVICAL DYSPHAGIA: ICD-10-CM

## 2024-04-18 PROCEDURE — 43239 EGD BIOPSY SINGLE/MULTIPLE: CPT | Performed by: INTERNAL MEDICINE

## 2024-04-18 PROCEDURE — 88305 TISSUE EXAM BY PATHOLOGIST: CPT | Performed by: PATHOLOGY

## 2024-04-18 PROCEDURE — 88312 SPECIAL STAINS GROUP 1: CPT | Performed by: PATHOLOGY

## 2024-04-18 PROCEDURE — 43249 ESOPH EGD DILATION <30 MM: CPT | Performed by: INTERNAL MEDICINE

## 2024-11-17 ENCOUNTER — CLAIMS CREATED FROM THE CLAIM WINDOW (OUTPATIENT)
Dept: URBAN - METROPOLITAN AREA MEDICAL CENTER 33 | Facility: MEDICAL CENTER | Age: 79
End: 2024-11-17
Payer: MEDICARE

## 2024-11-17 DIAGNOSIS — Z79.01 ADEQUATE ANTICOAGULATION ON ANTICOAGULANT THERAPY: ICD-10-CM

## 2024-11-17 DIAGNOSIS — T18.128A FOOD IMPACTION OF ESOPHAGUS: ICD-10-CM

## 2024-11-17 DIAGNOSIS — K22.2 ACQUIRED ESOPHAGEAL RING: ICD-10-CM

## 2024-11-17 PROCEDURE — 43235 EGD DIAGNOSTIC BRUSH WASH: CPT | Performed by: INTERNAL MEDICINE

## 2024-11-17 PROCEDURE — 99254 IP/OBS CNSLTJ NEW/EST MOD 60: CPT | Performed by: INTERNAL MEDICINE

## 2024-11-17 PROCEDURE — 43247 EGD REMOVE FOREIGN BODY: CPT | Performed by: INTERNAL MEDICINE

## 2024-11-17 PROCEDURE — 99284 EMERGENCY DEPT VISIT MOD MDM: CPT | Performed by: INTERNAL MEDICINE

## 2024-11-18 ENCOUNTER — TELEPHONE ENCOUNTER (OUTPATIENT)
Dept: URBAN - METROPOLITAN AREA CLINIC 105 | Facility: CLINIC | Age: 79
End: 2024-11-18

## 2024-12-09 ENCOUNTER — OFFICE VISIT (OUTPATIENT)
Dept: URBAN - METROPOLITAN AREA MEDICAL CENTER 33 | Facility: MEDICAL CENTER | Age: 79
End: 2024-12-09
Payer: MEDICARE

## 2024-12-09 DIAGNOSIS — Z98.0 H/O BILLROTH II OPERATION: ICD-10-CM

## 2024-12-09 DIAGNOSIS — K22.2 ACQUIRED ESOPHAGEAL RING: ICD-10-CM

## 2024-12-09 DIAGNOSIS — K31.89 OTHER DISEASES OF STOMACH AND DUODENUM: ICD-10-CM

## 2024-12-09 PROCEDURE — 43239 EGD BIOPSY SINGLE/MULTIPLE: CPT | Performed by: INTERNAL MEDICINE

## 2024-12-09 PROCEDURE — 43249 ESOPH EGD DILATION <30 MM: CPT | Performed by: INTERNAL MEDICINE

## 2024-12-16 ENCOUNTER — TELEPHONE ENCOUNTER (OUTPATIENT)
Dept: URBAN - METROPOLITAN AREA CLINIC 105 | Facility: CLINIC | Age: 79
End: 2024-12-16

## 2024-12-16 PROBLEM — 30811009: Status: ACTIVE | Noted: 2024-12-16

## 2025-03-18 ENCOUNTER — OFFICE VISIT (OUTPATIENT)
Dept: URBAN - METROPOLITAN AREA MEDICAL CENTER 33 | Facility: MEDICAL CENTER | Age: 80
End: 2025-03-18
Payer: MEDICARE

## 2025-03-18 DIAGNOSIS — K22.2 ACQUIRED ESOPHAGEAL RING: ICD-10-CM

## 2025-03-18 DIAGNOSIS — Z98.0 H/O BILLROTH II OPERATION: ICD-10-CM

## 2025-03-18 DIAGNOSIS — K31.4 GASTRIC DIVERTICULUM: ICD-10-CM

## 2025-03-18 PROCEDURE — 43249 ESOPH EGD DILATION <30 MM: CPT | Performed by: INTERNAL MEDICINE

## 2025-03-18 RX ORDER — APIXABAN 5 MG/1
AS DIRECTED TABLET, FILM COATED ORAL
Status: ACTIVE | COMMUNITY

## 2025-03-18 RX ORDER — CYPROHEPTADINE HYDROCHLORIDE 4 MG/1
1 TABLET TABLET ORAL TWICE A DAY
Qty: 60 | Refills: 0 | Status: ACTIVE | COMMUNITY

## 2025-03-18 RX ORDER — METOPROLOL SUCCINATE 50 MG/1
1 TABLET TABLET, FILM COATED, EXTENDED RELEASE ORAL ONCE A DAY
Status: ACTIVE | COMMUNITY

## 2025-03-18 RX ORDER — TIZANIDINE 4 MG/1
1 TABLET AS NEEDED TABLET ORAL AS NEEDED
Status: ACTIVE | COMMUNITY

## 2025-03-18 RX ORDER — METHIMAZOLE 5 MG/1
1 TABLET TABLET ORAL ONCE A DAY
Status: ACTIVE | COMMUNITY

## 2025-03-18 RX ORDER — VITAMIN A 2400 MCG
1 TABLET CAPSULE ORAL ONCE A DAY
Status: ACTIVE | COMMUNITY

## 2025-03-18 RX ORDER — LISINOPRIL 5 MG/1
1 TABLET TABLET ORAL ONCE A DAY
Status: ON HOLD | COMMUNITY

## 2025-03-18 RX ORDER — OMEPRAZOLE 40 MG/1
1 CAPSULE CAPSULE, DELAYED RELEASE ORAL TWICE DAILY
Qty: 60 | Refills: 1 | Status: ACTIVE | COMMUNITY

## 2025-03-18 RX ORDER — ATORVASTATIN CALCIUM 80 MG/1
1 TABLET TABLET, FILM COATED ORAL ONCE A DAY
Status: ACTIVE | COMMUNITY

## 2025-03-18 RX ORDER — AMLODIPINE BESYLATE 5 MG/1
1 TABLET TABLET ORAL ONCE A DAY
Status: ON HOLD | COMMUNITY